# Patient Record
Sex: MALE | Race: BLACK OR AFRICAN AMERICAN | NOT HISPANIC OR LATINO | Employment: FULL TIME | URBAN - METROPOLITAN AREA
[De-identification: names, ages, dates, MRNs, and addresses within clinical notes are randomized per-mention and may not be internally consistent; named-entity substitution may affect disease eponyms.]

---

## 2018-12-08 ENCOUNTER — HOSPITAL ENCOUNTER (EMERGENCY)
Facility: CLINIC | Age: 53
Discharge: HOME | End: 2018-12-08
Attending: INTERNAL MEDICINE | Admitting: INTERNAL MEDICINE
Payer: COMMERCIAL

## 2018-12-08 VITALS
HEART RATE: 98 BPM | WEIGHT: 180 LBS | OXYGEN SATURATION: 97 % | SYSTOLIC BLOOD PRESSURE: 143 MMHG | BODY MASS INDEX: 26.58 KG/M2 | DIASTOLIC BLOOD PRESSURE: 90 MMHG | RESPIRATION RATE: 22 BRPM | TEMPERATURE: 97.8 F

## 2018-12-08 DIAGNOSIS — F10.10 ALCOHOL ABUSE: ICD-10-CM

## 2018-12-08 DIAGNOSIS — R11.2 NAUSEA AND VOMITING, INTRACTABILITY OF VOMITING NOT SPECIFIED, UNSPECIFIED VOMITING TYPE: ICD-10-CM

## 2018-12-08 LAB
ALBUMIN SERPL-MCNC: 4.2 G/DL (ref 3.4–5)
ALP SERPL-CCNC: 121 U/L (ref 40–150)
ALT SERPL W P-5'-P-CCNC: 143 U/L (ref 0–70)
ANION GAP SERPL CALCULATED.3IONS-SCNC: 14 MMOL/L (ref 3–14)
AST SERPL W P-5'-P-CCNC: 202 U/L (ref 0–45)
BASOPHILS # BLD AUTO: 0.1 10E9/L (ref 0–0.2)
BASOPHILS NFR BLD AUTO: 1.1 %
BILIRUB SERPL-MCNC: 1.2 MG/DL (ref 0.2–1.3)
BUN SERPL-MCNC: 12 MG/DL (ref 7–30)
CALCIUM SERPL-MCNC: 9.2 MG/DL (ref 8.5–10.1)
CHLORIDE SERPL-SCNC: 103 MMOL/L (ref 94–109)
CO2 SERPL-SCNC: 21 MMOL/L (ref 20–32)
CREAT SERPL-MCNC: 0.77 MG/DL (ref 0.66–1.25)
DIFFERENTIAL METHOD BLD: ABNORMAL
EOSINOPHIL # BLD AUTO: 0 10E9/L (ref 0–0.7)
EOSINOPHIL NFR BLD AUTO: 0 %
ERYTHROCYTE [DISTWIDTH] IN BLOOD BY AUTOMATED COUNT: 12.5 % (ref 10–15)
ETHANOL SERPL-MCNC: <0.01 G/DL
GFR SERPL CREATININE-BSD FRML MDRD: >90 ML/MIN/1.7M2
GLUCOSE SERPL-MCNC: 188 MG/DL (ref 70–99)
HCT VFR BLD AUTO: 43.7 % (ref 40–53)
HGB BLD-MCNC: 15.1 G/DL (ref 13.3–17.7)
IMM GRANULOCYTES # BLD: 0 10E9/L (ref 0–0.4)
IMM GRANULOCYTES NFR BLD: 0.3 %
LIPASE SERPL-CCNC: 77 U/L (ref 73–393)
LYMPHOCYTES # BLD AUTO: 0.6 10E9/L (ref 0.8–5.3)
LYMPHOCYTES NFR BLD AUTO: 7.7 %
MCH RBC QN AUTO: 33.4 PG (ref 26.5–33)
MCHC RBC AUTO-ENTMCNC: 34.6 G/DL (ref 31.5–36.5)
MCV RBC AUTO: 97 FL (ref 78–100)
MONOCYTES # BLD AUTO: 0.7 10E9/L (ref 0–1.3)
MONOCYTES NFR BLD AUTO: 9.8 %
NEUTROPHILS # BLD AUTO: 6.1 10E9/L (ref 1.6–8.3)
NEUTROPHILS NFR BLD AUTO: 81.1 %
NRBC # BLD AUTO: 0 10*3/UL
NRBC BLD AUTO-RTO: 0 /100
PLATELET # BLD AUTO: 289 10E9/L (ref 150–450)
POTASSIUM SERPL-SCNC: 4.2 MMOL/L (ref 3.4–5.3)
PROT SERPL-MCNC: 8.1 G/DL (ref 6.8–8.8)
RBC # BLD AUTO: 4.52 10E12/L (ref 4.4–5.9)
SODIUM SERPL-SCNC: 138 MMOL/L (ref 133–144)
WBC # BLD AUTO: 7.5 10E9/L (ref 4–11)

## 2018-12-08 PROCEDURE — 96361 HYDRATE IV INFUSION ADD-ON: CPT

## 2018-12-08 PROCEDURE — 83690 ASSAY OF LIPASE: CPT | Performed by: INTERNAL MEDICINE

## 2018-12-08 PROCEDURE — 85025 COMPLETE CBC W/AUTO DIFF WBC: CPT | Performed by: INTERNAL MEDICINE

## 2018-12-08 PROCEDURE — 96375 TX/PRO/DX INJ NEW DRUG ADDON: CPT

## 2018-12-08 PROCEDURE — 96374 THER/PROPH/DIAG INJ IV PUSH: CPT

## 2018-12-08 PROCEDURE — 25000128 H RX IP 250 OP 636: Performed by: INTERNAL MEDICINE

## 2018-12-08 PROCEDURE — 80053 COMPREHEN METABOLIC PANEL: CPT | Performed by: INTERNAL MEDICINE

## 2018-12-08 PROCEDURE — 99284 EMERGENCY DEPT VISIT MOD MDM: CPT | Mod: 25

## 2018-12-08 PROCEDURE — 80320 DRUG SCREEN QUANTALCOHOLS: CPT | Performed by: INTERNAL MEDICINE

## 2018-12-08 RX ORDER — SODIUM CHLORIDE 9 MG/ML
1000 INJECTION, SOLUTION INTRAVENOUS CONTINUOUS
Status: DISCONTINUED | OUTPATIENT
Start: 2018-12-08 | End: 2018-12-08 | Stop reason: HOSPADM

## 2018-12-08 RX ORDER — ONDANSETRON 2 MG/ML
4 INJECTION INTRAMUSCULAR; INTRAVENOUS EVERY 30 MIN PRN
Status: DISCONTINUED | OUTPATIENT
Start: 2018-12-08 | End: 2018-12-08 | Stop reason: HOSPADM

## 2018-12-08 RX ORDER — DIPHENHYDRAMINE HYDROCHLORIDE 50 MG/ML
25 INJECTION INTRAMUSCULAR; INTRAVENOUS ONCE
Status: COMPLETED | OUTPATIENT
Start: 2018-12-08 | End: 2018-12-08

## 2018-12-08 RX ORDER — ONDANSETRON 4 MG/1
4 TABLET, ORALLY DISINTEGRATING ORAL EVERY 8 HOURS PRN
Qty: 10 TABLET | Refills: 0 | Status: SHIPPED | OUTPATIENT
Start: 2018-12-08 | End: 2018-12-11

## 2018-12-08 RX ADMIN — SODIUM CHLORIDE 1000 ML: 9 INJECTION, SOLUTION INTRAVENOUS at 11:32

## 2018-12-08 RX ADMIN — DIPHENHYDRAMINE HYDROCHLORIDE 25 MG: 50 INJECTION INTRAMUSCULAR; INTRAVENOUS at 11:32

## 2018-12-08 RX ADMIN — ONDANSETRON 4 MG: 2 INJECTION INTRAMUSCULAR; INTRAVENOUS at 11:32

## 2018-12-08 ASSESSMENT — ENCOUNTER SYMPTOMS
VOMITING: 1
CHILLS: 1
FEVER: 0
ABDOMINAL PAIN: 0
COUGH: 0
RHINORRHEA: 0
NAUSEA: 1
DIARRHEA: 1
SHORTNESS OF BREATH: 0

## 2018-12-08 NOTE — ED TRIAGE NOTES
Pt presents with c/o's vomiting since 0200. Pt admits to drinking over half a pint of vodka last night and smoking mariajuana. Pt is A&O, ABC's intact.

## 2018-12-08 NOTE — ED PROVIDER NOTES
History     Chief Complaint:  Vomitting      HPI   Neel Fuentes is a 53 year old male with history of diabetes who presents with vomiting. The patient states that his symptoms began this this morning at 0200. He admits that he was drinking and using marijuana last night. The patient mentions that he is not a daily drinker and uses marijuana occassionally. He also had episodes of diarrhea, chills, and diaphoresis. The patient mentions that he feels like he cannot stop drinking after he begins to do so. He expresses that he wants to get outpatient help for this. Here in the ED, he is complaining of feeling extremely nausea and dehydrated. He denies fever, abdominal pain, chest pain, shortness of breath, cough, congestion or rhinorrhea.     Allergies:  NKDA    Medications:    The patient is currently on no regular medications.    Past Medical History:    DM    Past Surgical History:    History reviewed. No pertinent past surgical history.    Family History:    History reviewed. No pertinent family history.    Social History:  Marital Status:   [2]  Current smoker  Alcohol use    Review of Systems   Constitutional: Positive for chills. Negative for fever.   HENT: Negative for congestion and rhinorrhea.    Respiratory: Negative for cough and shortness of breath.    Cardiovascular: Negative for chest pain.   Gastrointestinal: Positive for diarrhea, nausea and vomiting. Negative for abdominal pain.   All other systems reviewed and are negative.      Physical Exam     Patient Vitals for the past 24 hrs:   BP Temp Temp src Pulse Resp SpO2 Weight   12/08/18 1125 - 97.8  F (36.6  C) Temporal - - - -   12/08/18 1115 (!) 153/93 - Temporal 98 22 98 % 81.6 kg (180 lb)       Physical Exam   Constitutional: He is cooperative.   Moaning and retching   HENT:   Mouth/Throat: Oropharynx is clear and moist and mucous membranes are normal.   Eyes: Conjunctivae are normal.   Neck: Normal range of motion.   Cardiovascular: Regular  rhythm and normal heart sounds.    Pulmonary/Chest: Effort normal and breath sounds normal.   Abdominal: Soft. Normal appearance and bowel sounds are normal. There is no rebound and no guarding.   Musculoskeletal: Normal range of motion.   Lymphadenopathy:     He has no cervical adenopathy.   Neurological: He is alert.   Skin: Skin is warm and dry.   Psychiatric: He has a normal mood and affect.       Emergency Department Course   Laboratory:  CBC: WBC: 7.5, HGB: 15.1, PLT: 289  CMP: Glucose 188(H), (H), (H), o/w WNL (Creatinine: 0.77)    Alcohol ethyl: <0.01    Lipase 77    Interventions:  1132 Zofran 4mg IV   Benadryl 25mg iV   Normal saline 1,000mL IV       Emergency Department Course:  Nursing notes and vitals reviewed. (1110) I performed an exam of the patient as documented above.     IV inserted. Medicine administered as documented above. Blood drawn. This was sent to the lab for further testing, results above.     (1153) I rechecked the patient and discussed the results of his workup thus far. He is currently feeling much better and is asking for water.     Findings and plan explained to the Patient. Patient discharged home with instructions regarding supportive care, medications, and reasons to return. The importance of close follow-up was reviewed. The patient was prescribed Zofran    I personally reviewed the laboratory results with the Patient and answered all related questions prior to discharge.     Impression & Plan    Medical Decision Making:    Neel Fuentes is a 53 year old male who presents with intractable nausea and vomiting.  He attributed this to excess alcohol intake last night.  He indicates a pattern of intermittent excessive drinking and to to his credit has already made contact to arrange for outpatient treatment on Monday.  I do not see any indication of a surgical problem in the abdomen.  Discussed with him it is possible he has an early viral gastroenteritis.  He does have  known diabetes and we discussed his elevated glucose.  Discussed with him his elevated liver function tests.  I have discharged him home with oral Zofran as needed, continue pursuing alcohol treatment, return if unable to take fluids, significant abdominal pain or other problems.      Diagnosis:    ICD-10-CM    1. Nausea and vomiting, intractability of vomiting not specified, unspecified vomiting type R11.2    2. Alcohol abuse F10.10        Disposition:  discharged to home    Discharge Medications:  New Prescriptions    ONDANSETRON (ZOFRAN ODT) 4 MG ODT TAB    Take 1 tablet (4 mg) by mouth every 8 hours as needed for nausea       Scribe Disclosure:  I, Nirmala Vogel, am serving as a scribe on 12/8/2018 at 11:10 AM to personally document services performed by Yolande Stone MD based on my observations and the provider's statements to me.       Nirmala Vogel  12/8/2018   [facility] EMERGENCY DEPARTMENT       Yolande Stone MD  12/08/18 [id number]

## 2018-12-08 NOTE — ED AVS SNAPSHOT
[facility] Emergency Department    [address]    [facility] [address]    Phone:  [phone]    Fax:  [phone]                                       Neel Fuentes   MRN: [id number]    Department:  [facility] Emergency Department   Date of Visit:  12/8/2018           After Visit Summary Signature Page     I have received my discharge instructions, and my questions have been answered. I have discussed any challenges I see with this plan with the nurse or doctor.    ..........................................................................................................................................  Patient/Patient Representative Signature      ..........................................................................................................................................  Patient Representative Print Name and Relationship to Patient    ..................................................               ................................................  Date                                   Time    ..........................................................................................................................................  Reviewed by Signature/Title    ...................................................              ..............................................  Date                                               Time          22EPIC Rev 08/18

## 2018-12-08 NOTE — ED AVS SNAPSHOT
[facility] Emergency Department    [address] [place] [address]    Phone:  [phone]    Fax:  [phone]                                       Neel Fuentes   MRN: [id number]    Department:  [facility] Emergency Department   Date of Visit:  12/8/2018           Patient Information     Date Of Birth          1965        Your diagnoses for this visit were:     Nausea and vomiting, intractability of vomiting not specified, unspecified vomiting type     Alcohol abuse        You were seen by Yolande Stone MD.        Discharge Instructions         Discharge Instructions  Vomiting    You have been seen today for vomiting. This is usually caused by a virus, but some bacteria, parasites, medicines or other medical conditions can cause similar symptoms. At this time your doctor does not find that your vomiting is a sign of anything dangerous or life-threatening. However, sometimes the signs of serious illness do not show up right away. If you have new or worse symptoms, you may need to be seen again in the emergency department or by your primary doctor. Remember that serious problems like appendicitis can start as vomiting.     Return to the Emergency Department if:    You keep throwing up and you are not able to keep liquids down.     You feel you are getting dehydrated, such as being very thirsty, not urinating at least every 8-12 hours, or feeling faint or lightheaded.     You develop a new fever, or your fever continues for more than 2 days.     You have belly pain that seems worse than cramps, is in one spot, or is getting worse over time.     You have blood in your vomit or stools.     You feel very weak.    You are not starting to improve within 24 hours of your visit here.     What can I do to help myself?    The most important thing to do is to drink clear liquids. If you have been vomiting a lot, it is best to have only small, frequent sips of liquids.  Drinking too much at once may cause more vomiting. If you are vomiting often, you must replace minerals, sodium and potassium lost with your illness. Pedialyte  and sports drinks can help you replace these minerals. You can also drink clear liquids such as water, weak tea, apple juice, and 7-Up . Avoid acid liquids (orange), caffeine (coffee) or alcohol. Do not drink milk until you no longer have diarrhea.     After liquids are staying down, you may start eating mild foods. Soda crackers, toast, plain noodles, gelatin, applesauce and bananas are good first choices. Avoid foods that have acid, are spicy, fatty or have a lot of fiber (such as meats, coarse grains, vegetables). You may start eating these foods again in about 3 days when you are better.     Sometimes treatment includes prescription medicine to prevent nausea and vomiting. If your doctor prescribes these for you, take them as directed.     Don t take ibuprofen, or other nonsteroidal anti-inflammatory medicines without checking with your healthcare provider.   If you were given a prescription for medicine here today, be sure to read all of the information (including the package insert) that comes with your prescription.  This will include important information about the medicine, its side effects, and any warnings that you need to know about.  The pharmacist who fills the prescription can provide more information and answer questions you may have about the medicine.  If you have questions or concerns that the pharmacist cannot address, please call or return to the Emergency Department.       Remember that you can always come back to the Emergency Department if you are not able to see your regular doctor in the amount of time listed above, if you get any new symptoms, or if there is anything that worries you.        24 Hour Appointment Hotline       To make an appointment at any [facility], call [phone] ([phone]). If you don't have a  family doctor or clinic, we will help you find one. [facility] clinics are conveniently located to serve the needs of you and your family.             Review of your medicines      START taking        Dose / Directions Last dose taken    ondansetron 4 MG ODT tab   Commonly known as:  ZOFRAN ODT   Dose:  4 mg   Quantity:  10 tablet        Take 1 tablet (4 mg) by mouth every 8 hours as needed for nausea   Refills:  0          Our records show that you are taking the medicines listed below. If these are incorrect, please call your family doctor or clinic.        Dose / Directions Last dose taken    BENADRYL PO        Refills:  0        folic acid-vit B6-vit B12 0.8-10-0.115 MG Tabs per tablet   Commonly known as:  FOLGARD        Take by mouth daily   Refills:  0        SERTRALINE HCL PO        Take by mouth daily   Refills:  0                Prescriptions were sent or printed at these locations (1 Prescription)                   Other Prescriptions                Printed at Department/Unit printer (1 of 1)         ondansetron (ZOFRAN ODT) 4 MG ODT tab                Procedures and tests performed during your visit     Alcohol level blood    CBC with platelets differential    Comprehensive metabolic panel    Lipase      Orders Needing Specimen Collection     None      Pending Results     No orders found from 12/6/2018 to 12/9/2018.            Pending Culture Results     No orders found from 12/6/2018 to 12/9/2018.            Pending Results Instructions     If you had any lab results that were not finalized at the time of your Discharge, you can call the ED Lab Result RN at [phone]. You will be contacted by this team for any positive Lab results or changes in treatment. The nurses are available 7 days a week from 10A to 6:30P.  You can leave a message 24 hours per day and they will return your call.        Test Results From Your Hospital Stay        12/8/2018 11:50 AM      Component Results     Component Value Ref  Range & Units Status    WBC 7.5 4.0 - 11.0 10e9/L Final    RBC Count 4.52 4.4 - 5.9 10e12/L Final    Hemoglobin 15.1 13.3 - 17.7 g/dL Final    Hematocrit 43.7 40.0 - 53.0 % Final    MCV 97 78 - 100 fl Final    MCH 33.4 (H) 26.5 - 33.0 pg Final    MCHC 34.6 31.5 - 36.5 g/dL Final    RDW 12.5 10.0 - 15.0 % Final    Platelet Count 289 150 - 450 10e9/L Final    Diff Method Automated Method  Final    % Neutrophils 81.1 % Final    % Lymphocytes 7.7 % Final    % Monocytes 9.8 % Final    % Eosinophils 0.0 % Final    % Basophils 1.1 % Final    % Immature Granulocytes 0.3 % Final    Nucleated RBCs 0 0 /100 Final    Absolute Neutrophil 6.1 1.6 - 8.3 10e9/L Final    Absolute Lymphocytes 0.6 (L) 0.8 - 5.3 10e9/L Final    Absolute Monocytes 0.7 0.0 - 1.3 10e9/L Final    Absolute Eosinophils 0.0 0.0 - 0.7 10e9/L Final    Absolute Basophils 0.1 0.0 - 0.2 10e9/L Final    Abs Immature Granulocytes 0.0 0 - 0.4 10e9/L Final    Absolute Nucleated RBC 0.0  Final         12/8/2018 12:27 PM      Component Results     Component Value Ref Range & Units Status    Sodium 138 133 - 144 mmol/L Final    Potassium 4.2 3.4 - 5.3 mmol/L Final    Chloride 103 94 - 109 mmol/L Final    Carbon Dioxide 21 20 - 32 mmol/L Final    Anion Gap 14 3 - 14 mmol/L Final    Glucose 188 (H) 70 - 99 mg/dL Final    Urea Nitrogen 12 7 - 30 mg/dL Final    Creatinine 0.77 0.66 - 1.25 mg/dL Final    GFR Estimate >90 >60 mL/min/1.7m2 Final    Non  GFR Calc    GFR Estimate If Black >90 >60 mL/min/1.7m2 Final    African American GFR Calc    Calcium 9.2 8.5 - 10.1 mg/dL Final    Bilirubin Total 1.2 0.2 - 1.3 mg/dL Final    Albumin 4.2 3.4 - 5.0 g/dL Final    Protein Total 8.1 6.8 - 8.8 g/dL Final    Alkaline Phosphatase 121 40 - 150 U/L Final     (H) 0 - 70 U/L Final     (H) 0 - 45 U/L Final         12/8/2018 12:27 PM      Component Results     Component Value Ref Range & Units Status    Lipase 77 73 - 393 U/L Final         12/8/2018 12:27 PM       Component Results     Component Value Ref Range & Units Status    Ethanol g/dL <0.01 <0.01 g/dL Final                Clinical Quality Measure: Blood Pressure Screening     Your blood pressure was checked while you were in the emergency department today. The last reading we obtained was  BP: (!) 153/93 . Please read the guidelines below about what these numbers mean and what you should do about them.  If your systolic blood pressure (the top number) is less than 120 and your diastolic blood pressure (the bottom number) is less than 80, then your blood pressure is normal. There is nothing more that you need to do about it.  If your systolic blood pressure (the top number) is 120-139 or your diastolic blood pressure (the bottom number) is 80-89, your blood pressure may be higher than it should be. You should have your blood pressure rechecked within a year by a primary care provider.  If your systolic blood pressure (the top number) is 140 or greater or your diastolic blood pressure (the bottom number) is 90 or greater, you may have high blood pressure. High blood pressure is treatable, but if left untreated over time it can put you at risk for heart attack, stroke, or kidney failure. You should have your blood pressure rechecked by a primary care provider within the next 4 weeks.  If your provider in the emergency department today gave you specific instructions to follow-up with your doctor or provider even sooner than that, you should follow that instruction and not wait for up to 4 weeks for your follow-up visit.        Thank you for choosing [facility]       Thank you for choosing [facility] for your care. Our goal is always to provide you with excellent care. Hearing back from our patients is one way we can continue to improve our services. Please take a few minutes to complete the written survey that you may receive in the mail after you visit with us. Thank you!        [facility] Information     [facility] lets you send  "messages to your doctor, view your test results, renew your prescriptions, schedule appointments and more. To sign up, go to [email or web address] . Click on \"Log in\" on the left side of the screen, which will take you to the Welcome page. Then click on \"Sign up Now\" on the right side of the page.     You will be asked to enter the access code listed below, as well as some personal information. Please follow the directions to create your username and password.     Your access code is: [email or web address]  Expires: 3/8/2019 12:47 PM     Your access code will  in 90 days. If you need help or a new code, please call your [facility] clinic or [phone].        Care EveryWhere ID     This is your Care EveryWhere ID. This could be used by other organizations to access your [facility] medical records  [id number]        Equal Access to Services     NOE LOZANO : Hadii kelli Chang, waaxda lubryon, qaybta kaalmada maría, thomas mcgrath . So [facility] [phone].    ATENCIÓN: Si habla español, tiene a pitts disposición servicios gratuitos de asistencia lingüística. Llame al [phone].    We comply with applicable federal civil rights laws and [place] laws. We do not discriminate on the basis of race, color, national origin, age, disability, sex, sexual orientation, or gender identity.            After Visit Summary       This is your record. Keep this with you and show to your community pharmacist(s) and doctor(s) at your next visit.                  "

## 2018-12-08 NOTE — DISCHARGE INSTRUCTIONS
Discharge Instructions  Vomiting    You have been seen today for vomiting. This is usually caused by a virus, but some bacteria, parasites, medicines or other medical conditions can cause similar symptoms. At this time your doctor does not find that your vomiting is a sign of anything dangerous or life-threatening. However, sometimes the signs of serious illness do not show up right away. If you have new or worse symptoms, you may need to be seen again in the emergency department or by your primary doctor. Remember that serious problems like appendicitis can start as vomiting.     Return to the Emergency Department if:    You keep throwing up and you are not able to keep liquids down.     You feel you are getting dehydrated, such as being very thirsty, not urinating at least every 8-12 hours, or feeling faint or lightheaded.     You develop a new fever, or your fever continues for more than 2 days.     You have belly pain that seems worse than cramps, is in one spot, or is getting worse over time.     You have blood in your vomit or stools.     You feel very weak.    You are not starting to improve within 24 hours of your visit here.     What can I do to help myself?    The most important thing to do is to drink clear liquids. If you have been vomiting a lot, it is best to have only small, frequent sips of liquids. Drinking too much at once may cause more vomiting. If you are vomiting often, you must replace minerals, sodium and potassium lost with your illness. Pedialyte  and sports drinks can help you replace these minerals. You can also drink clear liquids such as water, weak tea, apple juice, and 7-Up . Avoid acid liquids (orange), caffeine (coffee) or alcohol. Do not drink milk until you no longer have diarrhea.     After liquids are staying down, you may start eating mild foods. Soda crackers, toast, plain noodles, gelatin, applesauce and bananas are good first choices. Avoid foods that have acid, are spicy,  fatty or have a lot of fiber (such as meats, coarse grains, vegetables). You may start eating these foods again in about 3 days when you are better.     Sometimes treatment includes prescription medicine to prevent nausea and vomiting. If your doctor prescribes these for you, take them as directed.     Don t take ibuprofen, or other nonsteroidal anti-inflammatory medicines without checking with your healthcare provider.   If you were given a prescription for medicine here today, be sure to read all of the information (including the package insert) that comes with your prescription.  This will include important information about the medicine, its side effects, and any warnings that you need to know about.  The pharmacist who fills the prescription can provide more information and answer questions you may have about the medicine.  If you have questions or concerns that the pharmacist cannot address, please call or return to the Emergency Department.       Remember that you can always come back to the Emergency Department if you are not able to see your regular doctor in the amount of time listed above, if you get any new symptoms, or if there is anything that worries you.

## 2022-12-17 ENCOUNTER — TRANSFERRED RECORDS (OUTPATIENT)
Dept: HEALTH INFORMATION MANAGEMENT | Facility: CLINIC | Age: 57
End: 2022-12-17

## 2022-12-17 ENCOUNTER — HOSPITAL ENCOUNTER (EMERGENCY)
Facility: CLINIC | Age: 57
Discharge: HOME | End: 2022-12-17
Attending: EMERGENCY MEDICINE | Admitting: EMERGENCY MEDICINE
Payer: COMMERCIAL

## 2022-12-17 ENCOUNTER — APPOINTMENT (OUTPATIENT)
Dept: GENERAL RADIOLOGY | Facility: CLINIC | Age: 57
End: 2022-12-17
Attending: EMERGENCY MEDICINE
Payer: COMMERCIAL

## 2022-12-17 VITALS
HEART RATE: 91 BPM | TEMPERATURE: 97.9 F | HEIGHT: 68 IN | SYSTOLIC BLOOD PRESSURE: 130 MMHG | BODY MASS INDEX: 27.28 KG/M2 | OXYGEN SATURATION: 97 % | RESPIRATION RATE: 22 BRPM | WEIGHT: 180 LBS | DIASTOLIC BLOOD PRESSURE: 79 MMHG

## 2022-12-17 DIAGNOSIS — R55 SYNCOPE, UNSPECIFIED SYNCOPE TYPE: ICD-10-CM

## 2022-12-17 DIAGNOSIS — D50.8 OTHER IRON DEFICIENCY ANEMIA: ICD-10-CM

## 2022-12-17 LAB
ANION GAP SERPL CALCULATED.3IONS-SCNC: 12 MMOL/L (ref 3–14)
ATRIAL RATE - MUSE: 93 BPM
BASOPHILS # BLD AUTO: 0 10E3/UL (ref 0–0.2)
BASOPHILS NFR BLD AUTO: 1 %
BUN SERPL-MCNC: 9 MG/DL (ref 7–30)
CALCIUM SERPL-MCNC: 8.9 MG/DL (ref 8.5–10.1)
CHLORIDE BLD-SCNC: 97 MMOL/L (ref 94–109)
CO2 SERPL-SCNC: 26 MMOL/L (ref 20–32)
CREAT SERPL-MCNC: 1.18 MG/DL (ref 0.66–1.25)
DIASTOLIC BLOOD PRESSURE - MUSE: NORMAL MMHG
EOSINOPHIL # BLD AUTO: 0 10E3/UL (ref 0–0.7)
EOSINOPHIL NFR BLD AUTO: 0 %
ERYTHROCYTE [DISTWIDTH] IN BLOOD BY AUTOMATED COUNT: 17.5 % (ref 10–15)
GFR SERPL CREATININE-BSD FRML MDRD: 72 ML/MIN/1.73M2
GLUCOSE BLD-MCNC: 202 MG/DL (ref 70–99)
HCT VFR BLD AUTO: 36.2 % (ref 40–53)
HGB BLD-MCNC: 11.5 G/DL (ref 13.3–17.7)
IMM GRANULOCYTES # BLD: 0 10E3/UL
IMM GRANULOCYTES NFR BLD: 0 %
INTERPRETATION ECG - MUSE: NORMAL
LYMPHOCYTES # BLD AUTO: 0.7 10E3/UL (ref 0.8–5.3)
LYMPHOCYTES NFR BLD AUTO: 10 %
MCH RBC QN AUTO: 28 PG (ref 26.5–33)
MCHC RBC AUTO-ENTMCNC: 31.8 G/DL (ref 31.5–36.5)
MCV RBC AUTO: 88 FL (ref 78–100)
MONOCYTES # BLD AUTO: 0.8 10E3/UL (ref 0–1.3)
MONOCYTES NFR BLD AUTO: 11 %
NEUTROPHILS # BLD AUTO: 5.2 10E3/UL (ref 1.6–8.3)
NEUTROPHILS NFR BLD AUTO: 78 %
NRBC # BLD AUTO: 0 10E3/UL
NRBC BLD AUTO-RTO: 0 /100
P AXIS - MUSE: 26 DEGREES
PLATELET # BLD AUTO: 249 10E3/UL (ref 150–450)
POTASSIUM BLD-SCNC: 3.4 MMOL/L (ref 3.4–5.3)
PR INTERVAL - MUSE: 160 MS
QRS DURATION - MUSE: 90 MS
QT - MUSE: 370 MS
QTC - MUSE: 460 MS
R AXIS - MUSE: -25 DEGREES
RBC # BLD AUTO: 4.11 10E6/UL (ref 4.4–5.9)
SODIUM SERPL-SCNC: 135 MMOL/L (ref 133–144)
SYSTOLIC BLOOD PRESSURE - MUSE: NORMAL MMHG
T AXIS - MUSE: 5 DEGREES
TROPONIN I SERPL HS-MCNC: 26 NG/L
VENTRICULAR RATE- MUSE: 93 BPM
WBC # BLD AUTO: 6.6 10E3/UL (ref 4–11)

## 2022-12-17 PROCEDURE — 82310 ASSAY OF CALCIUM: CPT | Performed by: EMERGENCY MEDICINE

## 2022-12-17 PROCEDURE — 84484 ASSAY OF TROPONIN QUANT: CPT | Performed by: EMERGENCY MEDICINE

## 2022-12-17 PROCEDURE — 99285 EMERGENCY DEPT VISIT HI MDM: CPT | Mod: 25

## 2022-12-17 PROCEDURE — 258N000003 HC RX IP 258 OP 636: Performed by: EMERGENCY MEDICINE

## 2022-12-17 PROCEDURE — 93005 ELECTROCARDIOGRAM TRACING: CPT

## 2022-12-17 PROCEDURE — 71046 X-RAY EXAM CHEST 2 VIEWS: CPT

## 2022-12-17 PROCEDURE — 85014 HEMATOCRIT: CPT | Performed by: EMERGENCY MEDICINE

## 2022-12-17 PROCEDURE — 82374 ASSAY BLOOD CARBON DIOXIDE: CPT | Performed by: EMERGENCY MEDICINE

## 2022-12-17 PROCEDURE — 96360 HYDRATION IV INFUSION INIT: CPT

## 2022-12-17 PROCEDURE — 36415 COLL VENOUS BLD VENIPUNCTURE: CPT | Performed by: EMERGENCY MEDICINE

## 2022-12-17 PROCEDURE — 96361 HYDRATE IV INFUSION ADD-ON: CPT

## 2022-12-17 RX ADMIN — SODIUM CHLORIDE 1000 ML: 9 INJECTION, SOLUTION INTRAVENOUS at 13:27

## 2022-12-17 RX ADMIN — SODIUM CHLORIDE 1000 ML: 9 INJECTION, SOLUTION INTRAVENOUS at 14:22

## 2022-12-17 ASSESSMENT — ENCOUNTER SYMPTOMS
NUMBNESS: 0
DIAPHORESIS: 1
CONSTIPATION: 0
NECK PAIN: 0
FEVER: 0
WEAKNESS: 0
HEADACHES: 0
DIARRHEA: 0
COUGH: 0
CHILLS: 1
SHORTNESS OF BREATH: 0
HEMATURIA: 0
DYSURIA: 0
FREQUENCY: 0

## 2022-12-17 ASSESSMENT — ACTIVITIES OF DAILY LIVING (ADL)
ADLS_ACUITY_SCORE: 35
ADLS_ACUITY_SCORE: 35

## 2022-12-17 NOTE — ED TRIAGE NOTES
Pt BIBA for syncopal episode. Coming from [facility]. Had a syncopal episode. Pt might have been lowered to ground. No symptoms currently except light headed when standing. Had minor syncopal episode for EMS. Orthostatic performed by EMS SBP dropped 20 points. . Pt incontinent of stool. 324mg aspirin given. VSS now. On RA. ABCs intact.

## 2022-12-17 NOTE — ED PROVIDER NOTES
History   Chief Complaint:  Syncope     The history is provided by the patient.      Neel Fuentes is a 57 year old male with history of type 2 diabetes, hypertension and hyperlipidemia who presents with syncope. Patient reports that he was shopping at [employer] when he suddenly had a syncopal episode. He states that he felt warm and diaphoretic prior to the syncope. Patient did not fall, hit his head or injury his extremities. He notes that he slid down the counter. He adds he felt baseline yesterday. No history of syncope. No medication changes. No fever, but he has intermittent chills. No chest pain, cough, urinary symptoms, shortness of breath or abnormal stools. No headache, neck pain, new numbness or new weakness. Patient did not have breakfast. EMS reports that the patient's pressures were soft on their arrival and his blood sugar was 321.  Of note he does have short acting insulin that he takes occasionally however since he is lost a significant amount of weight his diabetes has been extremely well controlled and he does not normally need to take it.  They gave 324 mg of aspirin. His most recent surgery was in January of last year. He has chronic bilateral upper arm numbness with cervical radiculopathy. He follows with ortho spine.     Review of Systems   Constitutional: Positive for chills and diaphoresis. Negative for fever.   Respiratory: Negative for cough and shortness of breath.    Cardiovascular: Negative for chest pain.   Gastrointestinal: Negative for constipation and diarrhea.   Genitourinary: Negative for dysuria, frequency, hematuria and urgency.   Musculoskeletal: Negative for neck pain.   Neurological: Positive for syncope. Negative for weakness, numbness and headaches.   All other systems reviewed and are negative.    Allergies:  No Known Allergies    Medications:  Hydroxyzine HCl  Sertraline  Amlodipine  Gabapentin  Zolpidem  Vardenafil  Aspirin 81 mg    Past Medical History:    "  Hypertension  Type 2 diabetes  Anxiety   Chronic pain of both shoulders  Alcohol abuse  Pes planus  Testicular hypofunction  Depression  Hyperlipidemia   Obese     Past Surgical History:    Gastric bypass  Bilateral shoulder surgeries    Family History:    Father - alcohol/drug abuse    Social History:  Presents with daughter  Presents via private vehicle  PCP: Soren Eubanks     Physical Exam     Patient Vitals for the past 24 hrs:   BP Temp Temp src Pulse Resp SpO2 Height Weight   12/17/22 1350 106/76 -- -- 84 22 -- -- --   12/17/22 1330 107/75 -- -- 89 11 -- -- --   12/17/22 1315 114/78 -- -- 101 20 100 % -- --   12/17/22 1300 114/79 -- -- 98 28 100 % -- --   12/17/22 1245 105/83 -- -- 96 19 99 % -- --   12/17/22 1241 104/67 97.9  F (36.6  C) Oral 103 18 100 % 1.727 m (5' 8\") 81.6 kg (180 lb)       Physical Exam  Physical Exam   Constitutional:  Patient is oriented to person, place, and time. They appear well-developed and well-nourished. Mild distress secondary to lightheadedness.   HENT:   Mouth/Throat:   Oropharynx is clear and moist.   Eyes:    Conjunctivae normal and EOM are normal. Pupils are equal, round, and reactive to light.   Neck:    Normal range of motion.   Cardiovascular: Normal rate, regular rhythm and normal heart sounds.  Exam reveals no gallop and no friction rub.  No murmur heard.  Pulmonary/Chest:  Effort normal and breath sounds normal. Patient has no wheezes. Patient has no rales.   Abdominal:   Soft. Bowel sounds are normal. Patient exhibits no mass. There is no tenderness. There is no rebound and no guarding.   Musculoskeletal:  Normal range of motion. Patient exhibits no edema.   Neurological:   Patient is alert and oriented to person, place, and time. Patient has normal strength. No cranial nerve deficit or sensory deficit. GCS 15. Bilateral chronic upper extremity numbness or weakness due to cervical radiculopathy  Skin:   Skin is warm and dry. No rash noted. No erythema. "   Psychiatric:   Patient has a normal mood and affect. Patient's behavior is normal. Judgment and thought content normal.       Emergency Department Course   ECG  ECG results from 12/17/22   EKG 12-lead, tracing only     Value    Systolic Blood Pressure     Diastolic Blood Pressure     Ventricular Rate 93    Atrial Rate 93    NY Interval 160    QRS Duration 90        QTc 460    P Axis 26    R AXIS -25    T Axis 5    Interpretation ECG      Sinus rhythm  Moderate voltage criteria for LVH, may be normal variant ( R in aVL , [employer] product )  Borderline ECG  No previous ECGs available         Imaging:  Chest XR,  PA & LAT   Final Result   IMPRESSION: Negative chest.        Report per radiology    Laboratory:  Labs Ordered and Resulted from Time of ED Arrival to Time of ED Departure   BASIC METABOLIC PANEL - Abnormal       Result Value    Sodium 135      Potassium 3.4      Chloride 97      Carbon Dioxide (CO2) 26      Anion Gap 12      Urea Nitrogen 9      Creatinine 1.18      Calcium 8.9      Glucose 202 (*)     GFR Estimate 72     CBC WITH PLATELETS AND DIFFERENTIAL - Abnormal    WBC Count 6.6      RBC Count 4.11 (*)     Hemoglobin 11.5 (*)     Hematocrit 36.2 (*)     MCV 88      MCH 28.0      MCHC 31.8      RDW 17.5 (*)     Platelet Count 249      % Neutrophils 78      % Lymphocytes 10      % Monocytes 11      % Eosinophils 0      % Basophils 1      % Immature Granulocytes 0      NRBCs per 100 WBC 0      Absolute Neutrophils 5.2      Absolute Lymphocytes 0.7 (*)     Absolute Monocytes 0.8      Absolute Eosinophils 0.0      Absolute Basophils 0.0      Absolute Immature Granulocytes 0.0      Absolute NRBCs 0.0     TROPONIN I - Normal    Troponin I High Sensitivity 26          Emergency Department Course:     Reviewed:  I reviewed nursing notes, vitals, past medical history and Care Everywhere    Assessments:  1359 I obtained history and examined the patient as noted above.   1427 I rechecked the patient and  continued my exam.     Interventions:  Medications   0.9% sodium chloride BOLUS (0 mLs Intravenous Stopped 12/17/22 1422)   0.9% sodium chloride BOLUS (1,000 mLs Intravenous New Bag 12/17/22 1422)     Disposition:  The patient was discharged to home.     Impression & Plan   Medical Decision Making:  Neel Chappell is a 57-year-old gentleman presenting to the emergency department after he had a witnessed syncopal episode.  He was lowered to the ground gradually did not sustain any injury.  He was not postictal nor did he sustain injury.  He had not eaten breakfast this morning which was not unusual for him he was out shopping quite a bit prior to this happening.  He denied any preceding symptoms such as headache chest pain or vertigo.  On my evaluation he had no complaints.  IV fluid had been started by medics.  I did do blood work here.  EKG shows no ischemia or arrhythmia.  Chest x-ray shows no evidence of pneumothorax, effusion, infiltrate, mass, abnormal mediastinum.  He had no symptoms that were concerning for dissection PE or myocarditis/pericarditis.  His blood sugar was 202 here he has no acute metabolic derangement.  He is chronically anemic and at his baseline his white blood cell count is normal.  Cardiac enzyme is unremarkable.  After receiving IV fluids orthostatics were performed he had no symptoms and they were unremarkable.  Certainly could have been due to vasovagal as he was shopping quite a bit in his winter coat.  I do not think this is ACS or stroke.  At this time the patient does desire to go home.  I will have him follow closely with his primary care doctor.      Diagnosis:  No diagnosis found.    Discharge Medications:  New Prescriptions    No medications on file       Scribe Disclosure:  I, Stefania Fried, am serving as a scribe at 1:34 PM on 12/17/2022 to document services personally performed by Arlene Kimball MD based on my observations and the provider's statements to me.             Arlene Kimball MD  12/18/22 1038

## 2022-12-17 NOTE — ED NOTES
Bed: ED02  Expected date:   Expected time:   Means of arrival:   Comments:  515  57 M syncope  Here now

## 2023-02-16 ENCOUNTER — TELEPHONE (OUTPATIENT)
Dept: BEHAVIORAL HEALTH | Facility: CLINIC | Age: 58
End: 2023-02-16
Payer: COMMERCIAL

## 2023-02-16 ENCOUNTER — HOSPITAL ENCOUNTER (OUTPATIENT)
Dept: BEHAVIORAL HEALTH | Facility: CLINIC | Age: 58
Discharge: HOME | End: 2023-02-16
Attending: FAMILY MEDICINE | Admitting: FAMILY MEDICINE
Payer: COMMERCIAL

## 2023-02-16 VITALS — WEIGHT: 175 LBS | BODY MASS INDEX: 26.52 KG/M2 | HEIGHT: 68 IN

## 2023-02-16 PROCEDURE — H0001 ALCOHOL AND/OR DRUG ASSESS: HCPCS | Mod: GT,95 | Performed by: COUNSELOR

## 2023-02-16 RX ORDER — ATORVASTATIN CALCIUM 10 MG/1
10 TABLET, FILM COATED ORAL DAILY
COMMUNITY
Start: 2022-10-11 | End: 2024-02-24

## 2023-02-16 RX ORDER — AMLODIPINE BESYLATE 10 MG/1
10 TABLET ORAL DAILY
COMMUNITY
Start: 2023-01-09 | End: 2024-02-24

## 2023-02-16 RX ORDER — SERTRALINE HYDROCHLORIDE 100 MG/1
1.5 TABLET, FILM COATED ORAL DAILY
COMMUNITY
Start: 2022-02-08

## 2023-02-16 RX ORDER — FERROUS SULFATE 325(65) MG
TABLET ORAL
COMMUNITY
Start: 2022-10-20 | End: 2024-02-24

## 2023-02-16 RX ORDER — GABAPENTIN 300 MG/1
300 CAPSULE ORAL
COMMUNITY
Start: 2022-06-23 | End: 2024-02-24

## 2023-02-16 RX ORDER — ZOLPIDEM TARTRATE 5 MG/1
5 TABLET ORAL
COMMUNITY
Start: 2022-06-23 | End: 2024-02-24

## 2023-02-16 RX ORDER — FOLIC ACID 1 MG/1
1 TABLET ORAL DAILY
COMMUNITY
Start: 2023-01-24 | End: 2024-01-24

## 2023-02-16 ASSESSMENT — ANXIETY QUESTIONNAIRES
5. BEING SO RESTLESS THAT IT IS HARD TO SIT STILL: NOT AT ALL
4. TROUBLE RELAXING: NOT AT ALL
6. BECOMING EASILY ANNOYED OR IRRITABLE: SEVERAL DAYS
GAD7 TOTAL SCORE: 3
1. FEELING NERVOUS, ANXIOUS, OR ON EDGE: NOT AT ALL
7. FEELING AFRAID AS IF SOMETHING AWFUL MIGHT HAPPEN: SEVERAL DAYS
2. NOT BEING ABLE TO STOP OR CONTROL WORRYING: NOT AT ALL
GAD7 TOTAL SCORE: 3
3. WORRYING TOO MUCH ABOUT DIFFERENT THINGS: SEVERAL DAYS

## 2023-02-16 ASSESSMENT — COLUMBIA-SUICIDE SEVERITY RATING SCALE - C-SSRS
TOTAL  NUMBER OF ABORTED OR SELF INTERRUPTED ATTEMPTS LIFETIME: NO
ATTEMPT LIFETIME: NO
TOTAL  NUMBER OF INTERRUPTED ATTEMPTS LIFETIME: NO
2. HAVE YOU ACTUALLY HAD ANY THOUGHTS OF KILLING YOURSELF?: NO
6. HAVE YOU EVER DONE ANYTHING, STARTED TO DO ANYTHING, OR PREPARED TO DO ANYTHING TO END YOUR LIFE?: NO
1. HAVE YOU WISHED YOU WERE DEAD OR WISHED YOU COULD GO TO SLEEP AND NOT WAKE UP?: NO

## 2023-02-16 ASSESSMENT — PAIN SCALES - GENERAL: PAINLEVEL: NO PAIN (0)

## 2023-02-16 ASSESSMENT — PATIENT HEALTH QUESTIONNAIRE - PHQ9: SUM OF ALL RESPONSES TO PHQ QUESTIONS 1-9: 6

## 2023-02-16 NOTE — TELEPHONE ENCOUNTER
Pt called stating he did not receive email link for appt today and was wondering if that link could be sent to him.  Due to phone issues, wtr did not complete call with him.

## 2023-02-16 NOTE — PROGRESS NOTES
[facility]  [address] [place], [place] [address]., [place] [address]        2/16/2023      Neel Fuentes  [address]  [place] [address]      Dear Mr. Fuentes,    It was a pleasure meeting with you on 2/16/2023 for your Chemical Dependency Evaluation. Based on your evaluation, the recommendation is:  1)  Complete an Intensive Outpatient CD Treatment Program.  2)  Abstain from all mood-altering chemicals unless prescribed by a licensed provider.   3)  Attend, at minimum, 2 weekly support group meetings, such as 12 step based (AA/NA), SMART Recovery, Health Realizations, and/or Refuge Recovery meetings.     4)  Actively work with a male mentor/sponsor on a weekly basis.   5)  Follow all the recommendations of your treatment/medical providers.    Treatment program options:    [facility]: [phone] [email or web address]  [facility]: [email or web address]  [facility]: [email or web address]    As we discussed, I am taking a new role within [facility] as of 2/20/2023. Freddy Baugh, my co-worker, will be taking over for me. You can call him at [phone].    Please let myself or Freddy Baugh know which program you would like to attend.    Sincerely,    Ting Donald [facility]  CD Evaluation Counselor  [phone]    (this was sent to pt)
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
## 2023-08-28 ENCOUNTER — APPOINTMENT (OUTPATIENT)
Dept: GENERAL RADIOLOGY | Facility: CLINIC | Age: 58
End: 2023-08-28
Attending: EMERGENCY MEDICINE
Payer: COMMERCIAL

## 2023-08-28 ENCOUNTER — HOSPITAL ENCOUNTER (EMERGENCY)
Facility: CLINIC | Age: 58
Discharge: HOME | End: 2023-08-29
Attending: EMERGENCY MEDICINE | Admitting: EMERGENCY MEDICINE
Payer: COMMERCIAL

## 2023-08-28 DIAGNOSIS — M70.21 OLECRANON BURSITIS OF RIGHT ELBOW: ICD-10-CM

## 2023-08-28 PROCEDURE — 10060 I&D ABSCESS SIMPLE/SINGLE: CPT

## 2023-08-28 PROCEDURE — 99284 EMERGENCY DEPT VISIT MOD MDM: CPT | Mod: 25

## 2023-08-28 PROCEDURE — 250N000013 HC RX MED GY IP 250 OP 250 PS 637: Performed by: EMERGENCY MEDICINE

## 2023-08-28 PROCEDURE — 29105 APPLICATION LONG ARM SPLINT: CPT | Mod: RT

## 2023-08-28 PROCEDURE — 76882 US LMTD JT/FCL EVL NVASC XTR: CPT

## 2023-08-28 PROCEDURE — 73080 X-RAY EXAM OF ELBOW: CPT | Mod: RT

## 2023-08-28 RX ORDER — ACETAMINOPHEN 325 MG/1
975 TABLET ORAL ONCE
Status: COMPLETED | OUTPATIENT
Start: 2023-08-28 | End: 2023-08-28

## 2023-08-28 RX ORDER — IBUPROFEN 600 MG/1
600 TABLET, FILM COATED ORAL ONCE
Status: COMPLETED | OUTPATIENT
Start: 2023-08-28 | End: 2023-08-28

## 2023-08-28 RX ADMIN — ACETAMINOPHEN 975 MG: 325 TABLET ORAL at 22:09

## 2023-08-28 RX ADMIN — IBUPROFEN 600 MG: 600 TABLET ORAL at 22:09

## 2023-08-29 VITALS
DIASTOLIC BLOOD PRESSURE: 106 MMHG | OXYGEN SATURATION: 98 % | SYSTOLIC BLOOD PRESSURE: 145 MMHG | HEART RATE: 90 BPM | RESPIRATION RATE: 18 BRPM | TEMPERATURE: 97.5 F

## 2023-08-29 LAB
ANION GAP SERPL CALCULATED.3IONS-SCNC: 15 MMOL/L (ref 7–15)
BASOPHILS # BLD AUTO: 0.1 10E3/UL (ref 0–0.2)
BASOPHILS NFR BLD AUTO: 1 %
BUN SERPL-MCNC: 12.5 MG/DL (ref 6–20)
CALCIUM SERPL-MCNC: 9 MG/DL (ref 8.6–10)
CHLORIDE SERPL-SCNC: 105 MMOL/L (ref 98–107)
CREAT SERPL-MCNC: 0.67 MG/DL (ref 0.67–1.17)
CRP SERPL-MCNC: <3 MG/L
DEPRECATED HCO3 PLAS-SCNC: 20 MMOL/L (ref 22–29)
EOSINOPHIL # BLD AUTO: 0.1 10E3/UL (ref 0–0.7)
EOSINOPHIL NFR BLD AUTO: 1 %
ERYTHROCYTE [DISTWIDTH] IN BLOOD BY AUTOMATED COUNT: 17 % (ref 10–15)
ERYTHROCYTE [SEDIMENTATION RATE] IN BLOOD BY WESTERGREN METHOD: 17 MM/HR (ref 0–20)
GFR SERPL CREATININE-BSD FRML MDRD: >90 ML/MIN/1.73M2
GLUCOSE SERPL-MCNC: 123 MG/DL (ref 70–99)
HCT VFR BLD AUTO: 30 % (ref 40–53)
HGB BLD-MCNC: 10 G/DL (ref 13.3–17.7)
IMM GRANULOCYTES # BLD: 0 10E3/UL
IMM GRANULOCYTES NFR BLD: 0 %
LYMPHOCYTES # BLD AUTO: 1.7 10E3/UL (ref 0.8–5.3)
LYMPHOCYTES NFR BLD AUTO: 23 %
MCH RBC QN AUTO: 29.6 PG (ref 26.5–33)
MCHC RBC AUTO-ENTMCNC: 33.3 G/DL (ref 31.5–36.5)
MCV RBC AUTO: 89 FL (ref 78–100)
MONOCYTES # BLD AUTO: 1.4 10E3/UL (ref 0–1.3)
MONOCYTES NFR BLD AUTO: 19 %
NEUTROPHILS # BLD AUTO: 4 10E3/UL (ref 1.6–8.3)
NEUTROPHILS NFR BLD AUTO: 56 %
NRBC # BLD AUTO: 0 10E3/UL
NRBC BLD AUTO-RTO: 0 /100
PLATELET # BLD AUTO: 284 10E3/UL (ref 150–450)
POTASSIUM SERPL-SCNC: 4.3 MMOL/L (ref 3.4–5.3)
RBC # BLD AUTO: 3.38 10E6/UL (ref 4.4–5.9)
SODIUM SERPL-SCNC: 140 MMOL/L (ref 136–145)
WBC # BLD AUTO: 7.3 10E3/UL (ref 4–11)

## 2023-08-29 PROCEDURE — 36415 COLL VENOUS BLD VENIPUNCTURE: CPT | Performed by: EMERGENCY MEDICINE

## 2023-08-29 PROCEDURE — 87070 CULTURE OTHR SPECIMN AEROBIC: CPT | Performed by: EMERGENCY MEDICINE

## 2023-08-29 PROCEDURE — 86140 C-REACTIVE PROTEIN: CPT | Performed by: EMERGENCY MEDICINE

## 2023-08-29 PROCEDURE — 80048 BASIC METABOLIC PNL TOTAL CA: CPT | Performed by: EMERGENCY MEDICINE

## 2023-08-29 PROCEDURE — 85025 COMPLETE CBC W/AUTO DIFF WBC: CPT | Performed by: EMERGENCY MEDICINE

## 2023-08-29 PROCEDURE — 85652 RBC SED RATE AUTOMATED: CPT | Performed by: EMERGENCY MEDICINE

## 2023-08-29 RX ORDER — LIDOCAINE HYDROCHLORIDE 10 MG/ML
INJECTION, SOLUTION EPIDURAL; INFILTRATION; INTRACAUDAL; PERINEURAL
Status: DISCONTINUED
Start: 2023-08-29 | End: 2023-08-29 | Stop reason: HOSPADM

## 2023-08-29 ASSESSMENT — ACTIVITIES OF DAILY LIVING (ADL): ADLS_ACUITY_SCORE: 33

## 2023-08-29 NOTE — DISCHARGE INSTRUCTIONS
Please monitor symptoms closely.  Follow-up with orthopedics in 2 to 3 days for recheck.    Return to the ER if you develop worsening pain, swelling, redness, drainage of pus, fevers or any other concerns.    Discharge Instructions  Splint Care    You had a splint put on today to help protect your injury and help it heal.  Splints are used to treat things like strains, sprains, large cuts, and fractures (broken bones). Splints are temporary and are designed to allow for swelling.    Be sure your splint is not too tight!  If you splint is too tight, it may cause loss of blood supply.  Signs of your splint being too tight include: your arm or leg hurting a lot more; your fingers or toes getting numb, cold, pale or blue; or your child is crying, fussing or seeming restless.    Generally, every Emergency Department visit should have a follow-up clinic visit with either a primary or a specialty clinic/provider. Please follow-up as instructed by your emergency provider today.  Return to the Emergency Department right away if:  You have increased pain or pressure around the injury.  You have numbness, tingling, or cool, pale, or blue toes or fingers past the injury.  Your child is more fussy than normal, crying a lot, or restless.  Your splint becomes soft, breaks, or is wet.  Your splint begins to smell bad.  Your splint is cutting into your skin.    Home care:  Keep the injured area above the level of your heart while laying or sitting down.  This will help decrease the swelling and the pain.  Keep the splint dry.  Do not put objects down or inside the splint.  If there is an elastic bandage (Ace  wrap) holding the splint on this may be loosened slightly to relieve pressure or pain.  If pain continues return to the Emergency Department right away.  Do not remove your splint by yourself unless told to by your provider.    Follow-up:  Sometimes the splint put on in the Emergency Department needs to be changed once the  swelling has gone down and a more permanent cast needs to be placed.  This is usually done by a bone specialist provider (Orthopedist).  Follow the instructions given to you by your provider today.    If you were given a prescription for medicine here today, be sure to read all of the information (including the package insert) that comes with your prescription.  This will include important information about the medicine, its side effects, and any warnings that you need to know about.  The pharmacist who fills the prescription can provide more information and answer questions you may have about the medicine.  If you have questions or concerns that the pharmacist cannot address, please call or return to the Emergency Department.     Remember that you can always come back to the Emergency Department if you are not able to see your regular provider in the amount of time listed above, if you get any new symptoms, or if there is anything that worries you.

## 2023-08-29 NOTE — ED PROVIDER NOTES
"  History     Chief Complaint:  Elbow Injury       HPI   Neel Fuentes is a 58 year old male with a history of diabetes who presents to the emergency department for evaluation of an elbow injury. Patient reports that he flew back from vacation in [facility] today and was feeling normal all day today including through the duration of the flight. After arriving home he noticed right elbow swelling and tingling of the entire right extremity from the shoulder to the finger tips. He says that the elbow is \"on fire\", although he does not feel heat coming off of it with touch but does feel movement of a possible fluid collection. Patient confirms pain in the elbow joint with movement of the right extremity. Patient has been experiencing chills but denies fever. Icing of the elbow helped with pain. Patient took Tylenol and ibuprofen upon arrival to the ED. He has baseline tingling of the hands secondary to carpal tunnel syndrome which he is supposed to have surgery for this week, however he has never experienced tingling like this before. Patient says that he was not aware of any injury or scratch to the elbow and does not recall resting on his elbows more than usual. Patient says that he does have a history of marijuana use but has no history of IV drug use. He notes history of nerve problems in the spine, scheduled for surgery to the cervical spine on September 5th.     Independent Historian:   None - Patient Only    Review of External Notes:   None     Medications:    Norvasc   Aspirin 81 mg   Lipitor  Zoloft  Sertraline  Levitra  Gabapentin     Past Medical History:   Diabetes mellitus   Hypertension   Alcohol abuse  Depression   Hyperlipidemia     Past Surgical History:    Gastric bypass  Shoulder scope x2    Physical Exam   Patient Vitals for the past 24 hrs:   BP Temp Pulse Resp SpO2   08/29/23 0122 (!) 145/106 -- 90 -- --   08/28/23 2205 (!) 159/122 97.5  F (36.4  C) 96 18 98 %        Physical Exam  General:              " Well-nourished              Speaking in full sentences  Eyes:              Conjunctiva without injection or scleral icterus  ENT:              Moist mucous membranes              Nares patent              Pinnae normal  Neck:              Full ROM              No stiffness appreciated  Resp:              Lungs CTAB              No crackles, wheezing or audible rubs              Good air movement  CV:                    Normal rate, regular rhythm              S1 and S2 present              No murmur, gallop or rub  Skin:              Warm, dry, well perfused              No rashes or open wounds on exposed skin  MSK:              Moves all extremities              RUE:              Localized swelling over olecranon process with fluctuance              No overlying erythema or warmth              No open wounds              Able to supinate / pronate at elbow joint without notable pain              Flexion at elbow limited by pain over olecranon bursa   Compartments soft throughout   2+ radial pulse   Able to wiggle all fingers  Neuro:              Alert              Answers questions appropriately              Moves all extremities equally              Gait stable  Psych:              Normal affect, normal mood    Emergency Department Course   Imaging:  POC US SOFT TISSUE   Final Result      Limited Bedside ED Ultrasound of Soft Tissue Procedure Note:      PROCEDURE: PERFORMED BY: Dr. Angel Kumar MD   INDICATIONS/SYMPTOM: Skin redness, evaluate for abscess, cellulitis or foreign body   PROBE: High frequency linear probe   BODY LOCATION: Soft tissue located on extremity       FINDINGS: Cobblestoning of soft tissue: absent    Hypoechoic fluid (ie abscess) identified: present measuring 5 cm    US utilized to access fluid pocket with needle   INTERPRETATION:  The soft tissue and muscle layers were evaluated.       Findings indicate fluid collection      IMAGE DOCUMENTATION: Images were archived to hard drive.   Unable to sync with PACs.            XR Elbow Right G/E 3 Views   Final Result   IMPRESSION: Normal joint spaces and alignment. No fracture or joint effusion. Mild degenerative spurring seen at the coronoid process and olecranon of the ulna. Marked soft tissue swelling is seen in the posterior elbow.         Report per radiology    Laboratory:  Labs Ordered and Resulted from Time of ED Arrival to Time of ED Departure   BASIC METABOLIC PANEL - Abnormal       Result Value    Sodium 140      Potassium 4.3      Chloride 105      Carbon Dioxide (CO2) 20 (*)     Anion Gap 15      Urea Nitrogen 12.5      Creatinine 0.67      Calcium 9.0      Glucose 123 (*)     GFR Estimate >90     CBC WITH PLATELETS AND DIFFERENTIAL - Abnormal    WBC Count 7.3      RBC Count 3.38 (*)     Hemoglobin 10.0 (*)     Hematocrit 30.0 (*)     MCV 89      MCH 29.6      MCHC 33.3      RDW 17.0 (*)     Platelet Count 284      % Neutrophils 56      % Lymphocytes 23      % Monocytes 19      % Eosinophils 1      % Basophils 1      % Immature Granulocytes 0      NRBCs per 100 WBC 0      Absolute Neutrophils 4.0      Absolute Lymphocytes 1.7      Absolute Monocytes 1.4 (*)     Absolute Eosinophils 0.1      Absolute Basophils 0.1      Absolute Immature Granulocytes 0.0      Absolute NRBCs 0.0     CRP INFLAMMATION - Normal    CRP Inflammation <3.00     ERYTHROCYTE SEDIMENTATION RATE AUTO - Normal    Erythrocyte Sedimentation Rate 17     AEROBIC BACTERIAL CULTURE ROUTINE        Procedures     Incision and Drainage     Procedure: Incision and Drainage     Consent: Verbal    Indication:  Bursitis    Location: Extremity, upper    Size: cm    Ultrasound Guidance: Yes, see separate procedural guidance POCUS note     Preparation: Povidone-iodine     Anesthesia/Sedation: Lidocaine - 1%     Procedure Detail:    Aspiration: Yes  Incision Type: Stab  Needle: 20 gauge  Wound Management: Left open   Packing: None     Patient Status: The patient tolerated the procedure  well: Yes. There were no complications. Aspiration yielded 3 ccs of bloody fluid.      Splint Placement     Procedure: Splint Placement     Indication: Pain    Consent: Verbal     Location: Right elbow    Preparation: Wounds were cleansed and dressed with a non-adherent bandage    Procedure detail:   Splint was applied by Myself  Splint type: Short-arm posterior slab  Splint materilal: Fiberglass  After placement I checked and adjusted the fit as needed to ensure proper positioning/fit.  Sensation and circulation are intact after splint placement.    Patient Status: The patient tolerated the procedure well: Yes. There were no complications.  Emergency Department Course & Assessments:             Interventions:  Medications   lidocaine (PF) (XYLOCAINE) 1 % injection (has no administration in time range)   acetaminophen (TYLENOL) tablet 975 mg (975 mg Oral $Given 8/28/23 2209)   ibuprofen (ADVIL/MOTRIN) tablet 600 mg (600 mg Oral $Given 8/28/23 2209)        Assessments:  2346 I obtained the history and examined the patient as noted above.   0035 I performed the incision and drainage procedure as noted above.   0110 I performed the splint placement procedure as noted above.     Independent Interpretation (X-rays, CTs, rhythm strip):  Reviewed patient's XR. There is no fracture.    Consultations/Discussion of Management or Tests:  None        Social Determinants of Health affecting care:   None    Disposition:  The patient was discharged to home.     Impression & Plan    Medical Decision Making:  Neel Fuentes is a 58-year-old male presenting to the ER for evaluation of right elbow pain.  VS on presentation reveal elevated BP though otherwise are unremarkable.  His evaluation is most consistent with olecranon bursitis, likely hemorrhagic.  Bedside ultrasound utilized, demonstrating a soft tissue fluid collection over the olecranon bursa.  After informed written and verbal consent obtained (including risk of infection,  bleeding, and damage to adjacent structures), needle aspiration performed demonstrating bloody fluid.  This will be sent for culture, though overall, very low suspicion for septic bursitis.  Labs demonstrate normal WBC count, normal ESR, and undetectable CRP.  Hemoglobin is 10.0, though stable compared with previous values available for comparison from outside facility.  I strongly considered septic arthritis though clinically feel this to be unlikely.  Patient does not exhibit notable pain over the medial aspect of the elbow, and maintains ability to supinate/pronate without difficulty.  He was placed in a splint for immobilization to assist with comfort.  I recommended very close follow-up with orthopedic surgery in the next 2 days for reevaluation.  With reasonable clinical certainty I feel antibiotic therapy can be safely withheld at this time.  Patient feels very comfortable with outlined plan of care.  He will continue with NSAID medications as needed for pain or discomfort.  Return to ER with any new or troubling symptoms such as increasing pain, fevers, drainage, or any other concerns.     Diagnosis:    ICD-10-CM    1. Olecranon bursitis of right elbow  M70.21          Scribe Disclosure:  I, Leticia Raphael, am serving as a scribe  for Venessa Conklin at 12:43 AM on 8/29/2023 to document services personally performed by Angel Kumar MD based on my observations and the provider's statements to me.    8/28/2023   Angel Kumar MD Roach, Brian Donald, MD  08/29/23 023

## 2023-08-29 NOTE — ED TRIAGE NOTES
Arrives from home. States he just returned from [facility] and was at home and notice a bump on his right elbow, states it immediately became quite swollen, painful and states it feels like it is on fire. States this was no there a couple of hours ago on the flight home.

## 2023-09-03 LAB — BACTERIA SNV CULT: NO GROWTH

## 2024-02-24 ENCOUNTER — ANESTHESIA (OUTPATIENT)
Dept: SURGERY | Facility: CLINIC | Age: 59
End: 2024-02-24
Payer: COMMERCIAL

## 2024-02-24 ENCOUNTER — HOSPITAL ENCOUNTER (OUTPATIENT)
Facility: CLINIC | Age: 59
Setting detail: OBSERVATION
Discharge: HOME | End: 2024-02-25
Attending: EMERGENCY MEDICINE | Admitting: INTERNAL MEDICINE
Payer: COMMERCIAL

## 2024-02-24 ENCOUNTER — APPOINTMENT (OUTPATIENT)
Dept: CT IMAGING | Facility: CLINIC | Age: 59
End: 2024-02-24
Attending: EMERGENCY MEDICINE
Payer: COMMERCIAL

## 2024-02-24 ENCOUNTER — ANESTHESIA EVENT (OUTPATIENT)
Dept: SURGERY | Facility: CLINIC | Age: 59
End: 2024-02-24
Payer: COMMERCIAL

## 2024-02-24 ENCOUNTER — APPOINTMENT (OUTPATIENT)
Dept: GENERAL RADIOLOGY | Facility: CLINIC | Age: 59
End: 2024-02-24
Attending: EMERGENCY MEDICINE
Payer: COMMERCIAL

## 2024-02-24 ENCOUNTER — APPOINTMENT (OUTPATIENT)
Dept: ULTRASOUND IMAGING | Facility: CLINIC | Age: 59
End: 2024-02-24
Attending: EMERGENCY MEDICINE
Payer: COMMERCIAL

## 2024-02-24 DIAGNOSIS — K81.9 CHOLECYSTITIS: Primary | ICD-10-CM

## 2024-02-24 DIAGNOSIS — K80.20 SYMPTOMATIC CHOLELITHIASIS: ICD-10-CM

## 2024-02-24 DIAGNOSIS — R07.9 CHEST PAIN, UNSPECIFIED TYPE: ICD-10-CM

## 2024-02-24 DIAGNOSIS — K76.0 HEPATIC STEATOSIS: ICD-10-CM

## 2024-02-24 DIAGNOSIS — R11.2 NAUSEA AND VOMITING, UNSPECIFIED VOMITING TYPE: ICD-10-CM

## 2024-02-24 DIAGNOSIS — R10.10 INTRACTABLE UPPER ABDOMINAL PAIN: ICD-10-CM

## 2024-02-24 DIAGNOSIS — I10 HYPERTENSION, UNSPECIFIED TYPE: ICD-10-CM

## 2024-02-24 LAB
ALBUMIN SERPL BCG-MCNC: 4.8 G/DL (ref 3.5–5.2)
ALP SERPL-CCNC: 216 U/L (ref 40–150)
ALT SERPL W P-5'-P-CCNC: 93 U/L (ref 0–70)
ANION GAP SERPL CALCULATED.3IONS-SCNC: 22 MMOL/L (ref 7–15)
AST SERPL W P-5'-P-CCNC: 150 U/L (ref 0–45)
BASOPHILS # BLD AUTO: 0.1 10E3/UL (ref 0–0.2)
BASOPHILS NFR BLD AUTO: 1 %
BILIRUB DIRECT SERPL-MCNC: 0.59 MG/DL (ref 0–0.3)
BILIRUB SERPL-MCNC: 1.4 MG/DL
BUN SERPL-MCNC: 14.7 MG/DL (ref 6–20)
CALCIUM SERPL-MCNC: 10.2 MG/DL (ref 8.6–10)
CHLORIDE SERPL-SCNC: 100 MMOL/L (ref 98–107)
CREAT SERPL-MCNC: 0.83 MG/DL (ref 0.67–1.17)
DEPRECATED HCO3 PLAS-SCNC: 17 MMOL/L (ref 22–29)
EGFRCR SERPLBLD CKD-EPI 2021: >90 ML/MIN/1.73M2
EOSINOPHIL # BLD AUTO: 0 10E3/UL (ref 0–0.7)
EOSINOPHIL NFR BLD AUTO: 0 %
ERYTHROCYTE [DISTWIDTH] IN BLOOD BY AUTOMATED COUNT: 16.8 % (ref 10–15)
ETHANOL SERPL-MCNC: <0.01 G/DL
GLUCOSE BLDC GLUCOMTR-MCNC: 144 MG/DL (ref 70–99)
GLUCOSE SERPL-MCNC: 152 MG/DL (ref 70–99)
HCT VFR BLD AUTO: 40.2 % (ref 40–53)
HGB BLD-MCNC: 13.5 G/DL (ref 13.3–17.7)
HOLD SPECIMEN: NORMAL
HOLD SPECIMEN: NORMAL
IMM GRANULOCYTES # BLD: 0 10E3/UL
IMM GRANULOCYTES NFR BLD: 0 %
LACTATE SERPL-SCNC: 0.7 MMOL/L (ref 0.7–2)
LACTATE SERPL-SCNC: 2.6 MMOL/L (ref 0.7–2)
LACTATE SERPL-SCNC: 2.7 MMOL/L (ref 0.7–2)
LIPASE SERPL-CCNC: 22 U/L (ref 13–60)
LYMPHOCYTES # BLD AUTO: 1.5 10E3/UL (ref 0.8–5.3)
LYMPHOCYTES NFR BLD AUTO: 21 %
MCH RBC QN AUTO: 30.2 PG (ref 26.5–33)
MCHC RBC AUTO-ENTMCNC: 33.6 G/DL (ref 31.5–36.5)
MCV RBC AUTO: 90 FL (ref 78–100)
MONOCYTES # BLD AUTO: 1.3 10E3/UL (ref 0–1.3)
MONOCYTES NFR BLD AUTO: 18 %
NEUTROPHILS # BLD AUTO: 4.5 10E3/UL (ref 1.6–8.3)
NEUTROPHILS NFR BLD AUTO: 60 %
NRBC # BLD AUTO: 0 10E3/UL
NRBC BLD AUTO-RTO: 0 /100
PLATELET # BLD AUTO: 370 10E3/UL (ref 150–450)
POTASSIUM SERPL-SCNC: 4.2 MMOL/L (ref 3.4–5.3)
PROT SERPL-MCNC: 8.6 G/DL (ref 6.4–8.3)
RBC # BLD AUTO: 4.47 10E6/UL (ref 4.4–5.9)
SODIUM SERPL-SCNC: 139 MMOL/L (ref 135–145)
TROPONIN T SERPL HS-MCNC: 10 NG/L
TROPONIN T SERPL HS-MCNC: 13 NG/L
WBC # BLD AUTO: 7.4 10E3/UL (ref 4–11)

## 2024-02-24 PROCEDURE — 88304 TISSUE EXAM BY PATHOLOGIST: CPT | Mod: TC | Performed by: SURGERY

## 2024-02-24 PROCEDURE — 250N000011 HC RX IP 250 OP 636: Performed by: NURSE ANESTHETIST, CERTIFIED REGISTERED

## 2024-02-24 PROCEDURE — 258N000003 HC RX IP 258 OP 636: Performed by: NURSE ANESTHETIST, CERTIFIED REGISTERED

## 2024-02-24 PROCEDURE — 74177 CT ABD & PELVIS W/CONTRAST: CPT

## 2024-02-24 PROCEDURE — 250N000009 HC RX 250: Performed by: EMERGENCY MEDICINE

## 2024-02-24 PROCEDURE — 96376 TX/PRO/DX INJ SAME DRUG ADON: CPT | Mod: 59

## 2024-02-24 PROCEDURE — 82077 ASSAY SPEC XCP UR&BREATH IA: CPT | Performed by: EMERGENCY MEDICINE

## 2024-02-24 PROCEDURE — 71046 X-RAY EXAM CHEST 2 VIEWS: CPT

## 2024-02-24 PROCEDURE — 76705 ECHO EXAM OF ABDOMEN: CPT

## 2024-02-24 PROCEDURE — 80053 COMPREHEN METABOLIC PANEL: CPT | Performed by: EMERGENCY MEDICINE

## 2024-02-24 PROCEDURE — G0378 HOSPITAL OBSERVATION PER HR: HCPCS

## 2024-02-24 PROCEDURE — 96375 TX/PRO/DX INJ NEW DRUG ADDON: CPT

## 2024-02-24 PROCEDURE — 710N000009 HC RECOVERY PHASE 1, LEVEL 1, PER MIN: Performed by: SURGERY

## 2024-02-24 PROCEDURE — 250N000025 HC SEVOFLURANE, PER MIN: Performed by: SURGERY

## 2024-02-24 PROCEDURE — 36415 COLL VENOUS BLD VENIPUNCTURE: CPT | Performed by: PHYSICIAN ASSISTANT

## 2024-02-24 PROCEDURE — 85025 COMPLETE CBC W/AUTO DIFF WBC: CPT | Performed by: EMERGENCY MEDICINE

## 2024-02-24 PROCEDURE — 258N000003 HC RX IP 258 OP 636: Performed by: EMERGENCY MEDICINE

## 2024-02-24 PROCEDURE — 370N000017 HC ANESTHESIA TECHNICAL FEE, PER MIN: Performed by: SURGERY

## 2024-02-24 PROCEDURE — 47562 LAPAROSCOPIC CHOLECYSTECTOMY: CPT | Mod: AS | Performed by: PHYSICIAN ASSISTANT

## 2024-02-24 PROCEDURE — 999N000141 HC STATISTIC PRE-PROCEDURE NURSING ASSESSMENT: Performed by: SURGERY

## 2024-02-24 PROCEDURE — 88304 TISSUE EXAM BY PATHOLOGIST: CPT | Mod: 26 | Performed by: PATHOLOGY

## 2024-02-24 PROCEDURE — 99285 EMERGENCY DEPT VISIT HI MDM: CPT | Mod: 25

## 2024-02-24 PROCEDURE — C9113 INJ PANTOPRAZOLE SODIUM, VIA: HCPCS | Performed by: PHYSICIAN ASSISTANT

## 2024-02-24 PROCEDURE — 80048 BASIC METABOLIC PNL TOTAL CA: CPT | Performed by: EMERGENCY MEDICINE

## 2024-02-24 PROCEDURE — 250N000011 HC RX IP 250 OP 636: Performed by: EMERGENCY MEDICINE

## 2024-02-24 PROCEDURE — 250N000011 HC RX IP 250 OP 636: Performed by: SURGERY

## 2024-02-24 PROCEDURE — 96374 THER/PROPH/DIAG INJ IV PUSH: CPT | Mod: 59

## 2024-02-24 PROCEDURE — 250N000009 HC RX 250: Performed by: NURSE ANESTHETIST, CERTIFIED REGISTERED

## 2024-02-24 PROCEDURE — 84484 ASSAY OF TROPONIN QUANT: CPT | Performed by: EMERGENCY MEDICINE

## 2024-02-24 PROCEDURE — 250N000011 HC RX IP 250 OP 636: Performed by: PHYSICIAN ASSISTANT

## 2024-02-24 PROCEDURE — 258N000003 HC RX IP 258 OP 636: Performed by: ANESTHESIOLOGY

## 2024-02-24 PROCEDURE — 36415 COLL VENOUS BLD VENIPUNCTURE: CPT | Performed by: EMERGENCY MEDICINE

## 2024-02-24 PROCEDURE — 82248 BILIRUBIN DIRECT: CPT | Performed by: EMERGENCY MEDICINE

## 2024-02-24 PROCEDURE — S2900 ROBOTIC SURGICAL SYSTEM: HCPCS | Performed by: SURGERY

## 2024-02-24 PROCEDURE — 83605 ASSAY OF LACTIC ACID: CPT | Performed by: EMERGENCY MEDICINE

## 2024-02-24 PROCEDURE — 96361 HYDRATE IV INFUSION ADD-ON: CPT | Mod: 59

## 2024-02-24 PROCEDURE — 82962 GLUCOSE BLOOD TEST: CPT

## 2024-02-24 PROCEDURE — 272N000001 HC OR GENERAL SUPPLY STERILE: Performed by: SURGERY

## 2024-02-24 PROCEDURE — 250N000009 HC RX 250: Performed by: SURGERY

## 2024-02-24 PROCEDURE — 99222 1ST HOSP IP/OBS MODERATE 55: CPT | Performed by: PHYSICIAN ASSISTANT

## 2024-02-24 PROCEDURE — 360N000080 HC SURGERY LEVEL 7, PER MIN: Performed by: SURGERY

## 2024-02-24 PROCEDURE — 93005 ELECTROCARDIOGRAM TRACING: CPT | Mod: 59

## 2024-02-24 PROCEDURE — 47562 LAPAROSCOPIC CHOLECYSTECTOMY: CPT | Performed by: SURGERY

## 2024-02-24 PROCEDURE — 83605 ASSAY OF LACTIC ACID: CPT | Performed by: PHYSICIAN ASSISTANT

## 2024-02-24 PROCEDURE — 83690 ASSAY OF LIPASE: CPT | Performed by: EMERGENCY MEDICINE

## 2024-02-24 PROCEDURE — 99204 OFFICE O/P NEW MOD 45 MIN: CPT | Mod: 57 | Performed by: SURGERY

## 2024-02-24 RX ORDER — ACETAMINOPHEN 325 MG/1
650 TABLET ORAL EVERY 8 HOURS PRN
Status: DISCONTINUED | OUTPATIENT
Start: 2024-02-24 | End: 2024-02-25 | Stop reason: HOSPADM

## 2024-02-24 RX ORDER — NALOXONE HYDROCHLORIDE 0.4 MG/ML
0.4 INJECTION, SOLUTION INTRAMUSCULAR; INTRAVENOUS; SUBCUTANEOUS
Status: DISCONTINUED | OUTPATIENT
Start: 2024-02-24 | End: 2024-02-25 | Stop reason: HOSPADM

## 2024-02-24 RX ORDER — ONDANSETRON 4 MG/1
4 TABLET, ORALLY DISINTEGRATING ORAL EVERY 6 HOURS PRN
Status: DISCONTINUED | OUTPATIENT
Start: 2024-02-24 | End: 2024-02-25 | Stop reason: HOSPADM

## 2024-02-24 RX ORDER — PROPOFOL 10 MG/ML
INJECTION, EMULSION INTRAVENOUS CONTINUOUS PRN
Status: DISCONTINUED | OUTPATIENT
Start: 2024-02-24 | End: 2024-02-24

## 2024-02-24 RX ORDER — CEFTRIAXONE 2 G/1
2 INJECTION, POWDER, FOR SOLUTION INTRAMUSCULAR; INTRAVENOUS EVERY 24 HOURS
Status: DISCONTINUED | OUTPATIENT
Start: 2024-02-24 | End: 2024-02-25 | Stop reason: HOSPADM

## 2024-02-24 RX ORDER — MORPHINE SULFATE 4 MG/ML
4 INJECTION, SOLUTION INTRAMUSCULAR; INTRAVENOUS
Status: DISCONTINUED | OUTPATIENT
Start: 2024-02-24 | End: 2024-02-24

## 2024-02-24 RX ORDER — ONDANSETRON 2 MG/ML
4 INJECTION INTRAMUSCULAR; INTRAVENOUS EVERY 6 HOURS PRN
Status: DISCONTINUED | OUTPATIENT
Start: 2024-02-24 | End: 2024-02-25 | Stop reason: HOSPADM

## 2024-02-24 RX ORDER — AMLODIPINE BESYLATE 10 MG/1
10 TABLET ORAL DAILY
COMMUNITY

## 2024-02-24 RX ORDER — HYDROMORPHONE HCL IN WATER/PF 6 MG/30 ML
0.4 PATIENT CONTROLLED ANALGESIA SYRINGE INTRAVENOUS EVERY 5 MIN PRN
Status: DISCONTINUED | OUTPATIENT
Start: 2024-02-24 | End: 2024-02-24 | Stop reason: HOSPADM

## 2024-02-24 RX ORDER — BUPIVACAINE HYDROCHLORIDE AND EPINEPHRINE 5; 5 MG/ML; UG/ML
INJECTION, SOLUTION PERINEURAL PRN
Status: DISCONTINUED | OUTPATIENT
Start: 2024-02-24 | End: 2024-02-24 | Stop reason: HOSPADM

## 2024-02-24 RX ORDER — ONDANSETRON 2 MG/ML
4 INJECTION INTRAMUSCULAR; INTRAVENOUS EVERY 30 MIN PRN
Status: COMPLETED | OUTPATIENT
Start: 2024-02-24 | End: 2024-02-24

## 2024-02-24 RX ORDER — NALOXONE HYDROCHLORIDE 0.4 MG/ML
0.2 INJECTION, SOLUTION INTRAMUSCULAR; INTRAVENOUS; SUBCUTANEOUS
Status: DISCONTINUED | OUTPATIENT
Start: 2024-02-24 | End: 2024-02-25 | Stop reason: HOSPADM

## 2024-02-24 RX ORDER — CEFAZOLIN SODIUM/WATER 2 G/20 ML
2 SYRINGE (ML) INTRAVENOUS SEE ADMIN INSTRUCTIONS
Status: DISCONTINUED | OUTPATIENT
Start: 2024-02-24 | End: 2024-02-24 | Stop reason: HOSPADM

## 2024-02-24 RX ORDER — LIDOCAINE HYDROCHLORIDE 20 MG/ML
INJECTION, SOLUTION INFILTRATION; PERINEURAL PRN
Status: DISCONTINUED | OUTPATIENT
Start: 2024-02-24 | End: 2024-02-24

## 2024-02-24 RX ORDER — PROPOFOL 10 MG/ML
INJECTION, EMULSION INTRAVENOUS PRN
Status: DISCONTINUED | OUTPATIENT
Start: 2024-02-24 | End: 2024-02-24

## 2024-02-24 RX ORDER — DEXAMETHASONE SODIUM PHOSPHATE 4 MG/ML
INJECTION, SOLUTION INTRA-ARTICULAR; INTRALESIONAL; INTRAMUSCULAR; INTRAVENOUS; SOFT TISSUE PRN
Status: DISCONTINUED | OUTPATIENT
Start: 2024-02-24 | End: 2024-02-24

## 2024-02-24 RX ORDER — GABAPENTIN 300 MG/1
300 CAPSULE ORAL 3 TIMES DAILY
Status: DISCONTINUED | OUTPATIENT
Start: 2024-02-25 | End: 2024-02-25 | Stop reason: HOSPADM

## 2024-02-24 RX ORDER — LABETALOL HYDROCHLORIDE 5 MG/ML
10 INJECTION, SOLUTION INTRAVENOUS
Status: DISCONTINUED | OUTPATIENT
Start: 2024-02-24 | End: 2024-02-24 | Stop reason: HOSPADM

## 2024-02-24 RX ORDER — FENTANYL CITRATE 50 UG/ML
50 INJECTION, SOLUTION INTRAMUSCULAR; INTRAVENOUS EVERY 5 MIN PRN
Status: DISCONTINUED | OUTPATIENT
Start: 2024-02-24 | End: 2024-02-24

## 2024-02-24 RX ORDER — SODIUM CHLORIDE, SODIUM LACTATE, POTASSIUM CHLORIDE, CALCIUM CHLORIDE 600; 310; 30; 20 MG/100ML; MG/100ML; MG/100ML; MG/100ML
INJECTION, SOLUTION INTRAVENOUS CONTINUOUS
Status: DISCONTINUED | OUTPATIENT
Start: 2024-02-24 | End: 2024-02-24 | Stop reason: HOSPADM

## 2024-02-24 RX ORDER — NALOXONE HYDROCHLORIDE 0.4 MG/ML
0.1 INJECTION, SOLUTION INTRAMUSCULAR; INTRAVENOUS; SUBCUTANEOUS
Status: DISCONTINUED | OUTPATIENT
Start: 2024-02-24 | End: 2024-02-24 | Stop reason: HOSPADM

## 2024-02-24 RX ORDER — ONDANSETRON 2 MG/ML
4 INJECTION INTRAMUSCULAR; INTRAVENOUS EVERY 30 MIN PRN
Status: DISCONTINUED | OUTPATIENT
Start: 2024-02-24 | End: 2024-02-24 | Stop reason: HOSPADM

## 2024-02-24 RX ORDER — BUPROPION HYDROCHLORIDE 150 MG/1
150 TABLET ORAL EVERY MORNING
Status: DISCONTINUED | OUTPATIENT
Start: 2024-02-25 | End: 2024-02-25 | Stop reason: HOSPADM

## 2024-02-24 RX ORDER — MAGNESIUM HYDROXIDE 1200 MG/15ML
LIQUID ORAL PRN
Status: DISCONTINUED | OUTPATIENT
Start: 2024-02-24 | End: 2024-02-24 | Stop reason: HOSPADM

## 2024-02-24 RX ORDER — IOPAMIDOL 755 MG/ML
500 INJECTION, SOLUTION INTRAVASCULAR ONCE
Status: COMPLETED | OUTPATIENT
Start: 2024-02-24 | End: 2024-02-24

## 2024-02-24 RX ORDER — ZOLPIDEM TARTRATE 10 MG/1
10 TABLET ORAL
COMMUNITY

## 2024-02-24 RX ORDER — FENTANYL CITRATE 50 UG/ML
25 INJECTION, SOLUTION INTRAMUSCULAR; INTRAVENOUS EVERY 5 MIN PRN
Status: DISCONTINUED | OUTPATIENT
Start: 2024-02-24 | End: 2024-02-24

## 2024-02-24 RX ORDER — ONDANSETRON 4 MG/1
4 TABLET, ORALLY DISINTEGRATING ORAL EVERY 30 MIN PRN
Status: DISCONTINUED | OUTPATIENT
Start: 2024-02-24 | End: 2024-02-24 | Stop reason: HOSPADM

## 2024-02-24 RX ORDER — FENTANYL CITRATE 50 UG/ML
INJECTION, SOLUTION INTRAMUSCULAR; INTRAVENOUS PRN
Status: DISCONTINUED | OUTPATIENT
Start: 2024-02-24 | End: 2024-02-24

## 2024-02-24 RX ORDER — HYDROMORPHONE HCL IN WATER/PF 6 MG/30 ML
0.2 PATIENT CONTROLLED ANALGESIA SYRINGE INTRAVENOUS EVERY 5 MIN PRN
Status: DISCONTINUED | OUTPATIENT
Start: 2024-02-24 | End: 2024-02-24 | Stop reason: HOSPADM

## 2024-02-24 RX ORDER — MORPHINE SULFATE 4 MG/ML
4 INJECTION, SOLUTION INTRAMUSCULAR; INTRAVENOUS
Status: COMPLETED | OUTPATIENT
Start: 2024-02-24 | End: 2024-02-24

## 2024-02-24 RX ORDER — BUPIVACAINE HYDROCHLORIDE AND EPINEPHRINE 5; 5 MG/ML; UG/ML
1-30 INJECTION, SOLUTION EPIDURAL; INTRACAUDAL; PERINEURAL ONCE
Status: DISCONTINUED | OUTPATIENT
Start: 2024-02-24 | End: 2024-02-25

## 2024-02-24 RX ORDER — KETOROLAC TROMETHAMINE 15 MG/ML
15 INJECTION, SOLUTION INTRAMUSCULAR; INTRAVENOUS ONCE
Status: DISCONTINUED | OUTPATIENT
Start: 2024-02-24 | End: 2024-02-24

## 2024-02-24 RX ORDER — HYDROMORPHONE HCL IN WATER/PF 6 MG/30 ML
0.2 PATIENT CONTROLLED ANALGESIA SYRINGE INTRAVENOUS
Status: DISCONTINUED | OUTPATIENT
Start: 2024-02-24 | End: 2024-02-25 | Stop reason: HOSPADM

## 2024-02-24 RX ORDER — INDOCYANINE GREEN AND WATER 25 MG
2.5 KIT INJECTION ONCE
Status: COMPLETED | OUTPATIENT
Start: 2024-02-24 | End: 2024-02-24

## 2024-02-24 RX ORDER — LIDOCAINE 40 MG/G
CREAM TOPICAL
Status: DISCONTINUED | OUTPATIENT
Start: 2024-02-24 | End: 2024-02-25 | Stop reason: HOSPADM

## 2024-02-24 RX ORDER — LIDOCAINE 40 MG/G
CREAM TOPICAL
Status: DISCONTINUED | OUTPATIENT
Start: 2024-02-24 | End: 2024-02-24 | Stop reason: HOSPADM

## 2024-02-24 RX ORDER — HYDROMORPHONE HCL IN WATER/PF 6 MG/30 ML
0.4 PATIENT CONTROLLED ANALGESIA SYRINGE INTRAVENOUS
Status: DISCONTINUED | OUTPATIENT
Start: 2024-02-24 | End: 2024-02-25 | Stop reason: HOSPADM

## 2024-02-24 RX ORDER — ONDANSETRON 2 MG/ML
INJECTION INTRAMUSCULAR; INTRAVENOUS PRN
Status: DISCONTINUED | OUTPATIENT
Start: 2024-02-24 | End: 2024-02-24

## 2024-02-24 RX ORDER — GABAPENTIN 300 MG/1
300 CAPSULE ORAL 3 TIMES DAILY
COMMUNITY

## 2024-02-24 RX ORDER — MORPHINE SULFATE 4 MG/ML
4 INJECTION, SOLUTION INTRAMUSCULAR; INTRAVENOUS ONCE
Status: COMPLETED | OUTPATIENT
Start: 2024-02-24 | End: 2024-02-24

## 2024-02-24 RX ORDER — OXYCODONE HYDROCHLORIDE 5 MG/1
5 TABLET ORAL EVERY 4 HOURS PRN
Status: DISCONTINUED | OUTPATIENT
Start: 2024-02-24 | End: 2024-02-25 | Stop reason: HOSPADM

## 2024-02-24 RX ORDER — BUPROPION HYDROCHLORIDE 150 MG/1
150 TABLET ORAL EVERY MORNING
COMMUNITY

## 2024-02-24 RX ORDER — CEFAZOLIN SODIUM/WATER 2 G/20 ML
2 SYRINGE (ML) INTRAVENOUS
Status: COMPLETED | OUTPATIENT
Start: 2024-02-24 | End: 2024-02-24

## 2024-02-24 RX ORDER — AMOXICILLIN 250 MG
1 CAPSULE ORAL 2 TIMES DAILY PRN
Status: DISCONTINUED | OUTPATIENT
Start: 2024-02-24 | End: 2024-02-25 | Stop reason: HOSPADM

## 2024-02-24 RX ORDER — AMOXICILLIN 250 MG
2 CAPSULE ORAL 2 TIMES DAILY PRN
Status: DISCONTINUED | OUTPATIENT
Start: 2024-02-24 | End: 2024-02-25 | Stop reason: HOSPADM

## 2024-02-24 RX ADMIN — ROCURONIUM BROMIDE 5 MG: 50 INJECTION, SOLUTION INTRAVENOUS at 17:21

## 2024-02-24 RX ADMIN — MORPHINE SULFATE 4 MG: 4 INJECTION, SOLUTION INTRAMUSCULAR; INTRAVENOUS at 13:22

## 2024-02-24 RX ADMIN — PHENYLEPHRINE HYDROCHLORIDE 100 MCG: 10 INJECTION INTRAVENOUS at 17:33

## 2024-02-24 RX ADMIN — SUGAMMADEX 200 MG: 100 INJECTION, SOLUTION INTRAVENOUS at 18:30

## 2024-02-24 RX ADMIN — INDOCYANINE GREEN AND WATER 2.5 MG: KIT at 16:06

## 2024-02-24 RX ADMIN — SODIUM CHLORIDE, POTASSIUM CHLORIDE, SODIUM LACTATE AND CALCIUM CHLORIDE: 600; 310; 30; 20 INJECTION, SOLUTION INTRAVENOUS at 16:06

## 2024-02-24 RX ADMIN — PROPOFOL 75 MCG/KG/MIN: 10 INJECTION, EMULSION INTRAVENOUS at 17:27

## 2024-02-24 RX ADMIN — SODIUM CHLORIDE 1000 ML: 9 INJECTION, SOLUTION INTRAVENOUS at 10:00

## 2024-02-24 RX ADMIN — ONDANSETRON 4 MG: 2 INJECTION INTRAMUSCULAR; INTRAVENOUS at 10:09

## 2024-02-24 RX ADMIN — ONDANSETRON 4 MG: 2 INJECTION INTRAMUSCULAR; INTRAVENOUS at 15:17

## 2024-02-24 RX ADMIN — SODIUM CHLORIDE, POTASSIUM CHLORIDE, SODIUM LACTATE AND CALCIUM CHLORIDE: 600; 310; 30; 20 INJECTION, SOLUTION INTRAVENOUS at 17:34

## 2024-02-24 RX ADMIN — CEFTRIAXONE 2 G: 2 INJECTION, POWDER, FOR SOLUTION INTRAMUSCULAR; INTRAVENOUS at 21:29

## 2024-02-24 RX ADMIN — Medication 60 MG: at 17:21

## 2024-02-24 RX ADMIN — FENTANYL CITRATE 100 MCG: 50 INJECTION INTRAMUSCULAR; INTRAVENOUS at 17:21

## 2024-02-24 RX ADMIN — Medication 2 G: at 17:15

## 2024-02-24 RX ADMIN — ONDANSETRON 4 MG: 2 INJECTION INTRAMUSCULAR; INTRAVENOUS at 18:25

## 2024-02-24 RX ADMIN — PANTOPRAZOLE SODIUM 40 MG: 40 INJECTION, POWDER, FOR SOLUTION INTRAVENOUS at 21:23

## 2024-02-24 RX ADMIN — LIDOCAINE HYDROCHLORIDE 50 MG: 20 INJECTION, SOLUTION INFILTRATION; PERINEURAL at 17:21

## 2024-02-24 RX ADMIN — DEXAMETHASONE SODIUM PHOSPHATE 4 MG: 4 INJECTION, SOLUTION INTRA-ARTICULAR; INTRALESIONAL; INTRAMUSCULAR; INTRAVENOUS; SOFT TISSUE at 17:21

## 2024-02-24 RX ADMIN — PHENYLEPHRINE HYDROCHLORIDE 200 MCG: 10 INJECTION INTRAVENOUS at 17:39

## 2024-02-24 RX ADMIN — ONDANSETRON 4 MG: 2 INJECTION INTRAMUSCULAR; INTRAVENOUS at 12:02

## 2024-02-24 RX ADMIN — ROCURONIUM BROMIDE 45 MG: 50 INJECTION, SOLUTION INTRAVENOUS at 17:27

## 2024-02-24 RX ADMIN — IOPAMIDOL 88 ML: 755 INJECTION, SOLUTION INTRAVENOUS at 12:41

## 2024-02-24 RX ADMIN — PHENYLEPHRINE HYDROCHLORIDE 200 MCG: 10 INJECTION INTRAVENOUS at 18:10

## 2024-02-24 RX ADMIN — MORPHINE SULFATE 4 MG: 4 INJECTION, SOLUTION INTRAMUSCULAR; INTRAVENOUS at 15:17

## 2024-02-24 RX ADMIN — SODIUM CHLORIDE 500 ML: 9 INJECTION, SOLUTION INTRAVENOUS at 12:22

## 2024-02-24 RX ADMIN — SODIUM CHLORIDE 62 ML: 9 INJECTION, SOLUTION INTRAVENOUS at 12:41

## 2024-02-24 RX ADMIN — PROPOFOL 140 MG: 10 INJECTION, EMULSION INTRAVENOUS at 17:21

## 2024-02-24 RX ADMIN — HYDROMORPHONE HYDROCHLORIDE 0.5 MG: 1 INJECTION, SOLUTION INTRAMUSCULAR; INTRAVENOUS; SUBCUTANEOUS at 17:58

## 2024-02-24 ASSESSMENT — COLUMBIA-SUICIDE SEVERITY RATING SCALE - C-SSRS
2. HAVE YOU ACTUALLY HAD ANY THOUGHTS OF KILLING YOURSELF IN THE PAST MONTH?: NO
1. IN THE PAST MONTH, HAVE YOU WISHED YOU WERE DEAD OR WISHED YOU COULD GO TO SLEEP AND NOT WAKE UP?: NO
6. HAVE YOU EVER DONE ANYTHING, STARTED TO DO ANYTHING, OR PREPARED TO DO ANYTHING TO END YOUR LIFE?: NO

## 2024-02-24 ASSESSMENT — ACTIVITIES OF DAILY LIVING (ADL)
ADLS_ACUITY_SCORE: 35

## 2024-02-24 ASSESSMENT — LIFESTYLE VARIABLES: TOBACCO_USE: 1

## 2024-02-24 ASSESSMENT — ENCOUNTER SYMPTOMS: DYSRHYTHMIAS: 0

## 2024-02-24 NOTE — PHARMACY-ADMISSION MEDICATION HISTORY
Pharmacist Admission Medication History    Admission medication history is complete. The information provided in this note is only as accurate as the sources available at the time of the update.    Information Source(s): Patient and CareEverywhere/SureScripts via in-person    Pertinent Information: No recent refill hx for Amlodipine but pt confirms dosing.    Changes made to PTA medication list:  Added: Wellbutrin XL, doing for Amlodipine, Gabapentin & Ambien.  Deleted: ASA, Lipitor, Voltaren gel, Benadryl, Iron, Folgard & Duplicate Zoloft.  Changed: None    Allergies reviewed with patient and updates made in EHR: yes    Medication History Completed By: Winter Montalvo PharmD 2/24/2024 3:09 PM    PTA Med List   Medication Sig Last Dose    amLODIPine (NORVASC) 10 MG tablet Take 10 mg by mouth daily Couple weeks ago    buPROPion (WELLBUTRIN XL) 150 MG 24 hr tablet Take 150 mg by mouth every morning 2/23/2024    gabapentin (NEURONTIN) 300 MG capsule Take 300 mg by mouth 3 times daily 2/23/2024 at pm    sertraline (ZOLOFT) 100 MG tablet Take 1.5 tablets by mouth daily 2/22/2024    zolpidem (AMBIEN) 10 MG tablet Take 10 mg by mouth nightly as needed for sleep 2/23/2024 at hs

## 2024-02-24 NOTE — ED NOTES
[facility]  ED Nurse Handoff Report    ED Chief complaint: Chest Pain  . ED Diagnosis:   Final diagnoses:   Intractable upper abdominal pain   Symptomatic cholelithiasis   Chest pain, unspecified type   Nausea and vomiting, unspecified vomiting type   Hypertension, unspecified type   Hepatic steatosis       Allergies: No Known Allergies    Code Status: Full Code    Activity level - Baseline/Home:  independent.  Activity Level - Current:   standby.   Lift room needed: No.   Bariatric: No   Needed: No   Isolation: No.   Infection: Not Applicable.     Respiratory status: Room air    Vital Signs (within 30 minutes):   Vitals:    02/24/24 1230 02/24/24 1330 02/24/24 1400 02/24/24 1430   BP:  (!) 159/96 (!) 144/112    Pulse:  87 66    Resp:       Temp:       TempSrc:       SpO2: 100% 100% 92% 100%       Cardiac Rhythm:  ,      Pain level:    Patient confused: No.   Patient Falls Risk: activity supervised.   Elimination Status: Has voided     Patient Report - Initial Complaint: Neel Fuentes is a 59 year old male who presents with abdominal pain and vomiting for the last 2 to 3 days follow-up with chest pain that started last night.  He has abdominal pain is localized to the right upper quadrant and is constant.  He has never had pain like this before.  He has associated nausea with vomiting.  He attributes the vomiting to starting sertraline which is a new medicine for him recently, however he was on it in the past without difficulty.  He stopped taking it yesterday.  Patient also reports he had an about 3-4 episodes of chest pain last night, which comes and goes, lasting about 10-15 minute before it resolves. He notes he has had vomiting for the last 2 days, as well as diarrhea intermittently. Endorses chills, sweats, shortness of breath, and abdominal pain in the right upper quadrant. Here in the ED, endorses abdominal pain but denies chest pain. Denies difficulty urinating.  Patient  "reports history of hyperlipidemia and hypertension, for which he was on medication previously. He states he discontinued these as he was \"taking too much medicine\". Also notes history of diabetes, stating this has resolved. Patient states he uses medical cannabis, and also reports recreational marijuana use. Notes history of alcohol use in the past but denies current use. Denies tobacco use. Endorses history of gastric bypass 25 years ago with no complications. Denies history of cholecystectomy, liver issues, STEMI, or heart disease.   Focused Assessment:   GastrointestinalGastrointestinal WDL: .WDL except; GI symptomsGI Signs/Symptoms: nausea; diarrhea; abdominal discomfortAbdominal Appearance: rounded; nondistendedAbdominal Palpation: RUQRUQ Abdominal Palpation: tenderNausea/Vomiting Signs/Symptoms: nausea intermittent; dry-heaving; emesisAdditional Documentation: Nausea/Vomiting Signs/Symptoms (Row)      1100  Cardiac  ML    Cardiac (Adult)Cardiac WDL: WDL      1100  Respiratory  ML    RespiratoryAirway WDL: WDL  Respiratory WDLRespiratory WDL: WDL           Abnormal Results:   Labs Ordered and Resulted from Time of ED Arrival to Time of ED Departure   BASIC METABOLIC PANEL - Abnormal       Result Value    Sodium 139      Potassium 4.2      Chloride 100      Carbon Dioxide (CO2) 17 (*)     Anion Gap 22 (*)     Urea Nitrogen 14.7      Creatinine 0.83      GFR Estimate >90      Calcium 10.2 (*)     Glucose 152 (*)    CBC WITH PLATELETS AND DIFFERENTIAL - Abnormal    WBC Count 7.4      RBC Count 4.47      Hemoglobin 13.5      Hematocrit 40.2      MCV 90      MCH 30.2      MCHC 33.6      RDW 16.8 (*)     Platelet Count 370      % Neutrophils 60      % Lymphocytes 21      % Monocytes 18      % Eosinophils 0      % Basophils 1      % Immature Granulocytes 0      NRBCs per 100 WBC 0      Absolute Neutrophils 4.5      Absolute Lymphocytes 1.5      Absolute Monocytes 1.3      Absolute Eosinophils 0.0      Absolute " Basophils 0.1      Absolute Immature Granulocytes 0.0      Absolute NRBCs 0.0     HEPATIC FUNCTION PANEL - Abnormal    Protein Total 8.6 (*)     Albumin 4.8      Bilirubin Total 1.4 (*)     Alkaline Phosphatase 216 (*)      (*)     ALT 93 (*)     Bilirubin Direct 0.59 (*)    LACTIC ACID WHOLE BLOOD - Abnormal    Lactic Acid 2.7 (*)    LACTIC ACID WHOLE BLOOD - Abnormal    Lactic Acid 2.6 (*)    TROPONIN T, HIGH SENSITIVITY - Normal    Troponin T, High Sensitivity 13     LIPASE - Normal    Lipase 22     ETHYL ALCOHOL LEVEL - Normal    Alcohol ethyl <0.01     TROPONIN T, HIGH SENSITIVITY - Normal    Troponin T, High Sensitivity 10     LACTIC ACID WHOLE BLOOD        CT Abdomen Pelvis w Contrast   Final Result   IMPRESSION:    1.  Similar small volume biliary sludge and gallstones in the moderately distended gallbladder.   2.  Hepatic steatosis.   3.  Gastric bypass.      Chest XR,  PA & LAT   Final Result   IMPRESSION: Negative chest.      US Abdomen Limited   Final Result   IMPRESSION:   1.  Tiny gallstones and gallbladder sludge within distended gallbladder. No cholecystitis nor bile duct dilatation.   2.  Diffuse hepatic steatosis.                Treatments provided: See MAR  Family Comments: Pt will contact  OBS brochure/video discussed/provided to patient:  Yes  ED Medications:   Medications   ondansetron (ZOFRAN) injection 4 mg (4 mg Intravenous $Given 2/24/24 1202)   sodium chloride 0.9% BOLUS 1,000 mL (0 mLs Intravenous Stopped 2/24/24 1122)   morphine (PF) injection 4 mg (0 mg Intravenous Return to Cabinet 2/24/24 1158)   sodium chloride 0.9% BOLUS 500 mL (0 mLs Intravenous Stopped 2/24/24 1344)   morphine (PF) injection 4 mg (4 mg Intravenous $Given 2/24/24 1322)   CT Scan Flush (62 mLs Intravenous $Given 2/24/24 1241)   iopamidol (ISOVUE-370) solution 500 mL (88 mLs Intravenous $Given 2/24/24 1241)       Drips infusing:  No  For the majority of the shift this patient was Green.   Interventions  performed were NA.    Sepsis treatment initiated: Yes    Per the ED Provider, Time Zero for severe sepsis or septic shock is:  1141    3 Hour Severe Sepsis Bundle Completion:  1. Initial Lactic Acid Result:   Recent Labs   Lab Test 02/24/24  1320 02/24/24  1130   LACT 2.6* 2.7*     2. Blood Cultures before Antibiotics: NA - Provider discretion, favor volume depletion vs sepsis - elevated lactic.  3. Broad Spectrum Antibiotics Administered:     Anti-infectives (From now, onward)      None          4. 1500 ml of IV fluids have been given so far      6 Hour Severe Sepsis Bundle Completion:    1. Repeat Lactic Acid Level: Last result   Lab Results   Component Value Date    LACT 2.6 (H) 02/24/2024     2. Patient currently on Vasopressors =  No    Cares/treatment/interventions/medications to be completed following ED care: Pain mgmt, general surgery consult, maintenance fluids.    ED Nurse Name: Alysa Estrella RN  2:33 PM

## 2024-02-24 NOTE — ANESTHESIA PROCEDURE NOTES
Airway       Patient location during procedure: OR       Procedure Start/Stop Times: 2/24/2024 5:23 PM  Staff -        CRNA: Tc Boroks APRN CRNA       Performed By: CRNAIndications and Patient Condition       Indications for airway management: asiya-procedural and airway protection       Induction type:intravenous       Mask difficulty assessment: 1 - vent by mask    Final Airway Details       Final airway type: endotracheal airway       Successful airway: ETT - single  Endotracheal Airway Details        ETT size (mm): 7.0       Cuffed: yes       Successful intubation technique: direct laryngoscopy       DL Blade Type: MAC 3       Grade View of Cords: 2       Adjucts: stylet       Position: Right       Measured from: gums/teeth       Secured at (cm): 24       Bite block used: None    Post intubation assessment        Placement verified by: capnometry, equal breath sounds and chest rise        Number of attempts at approach: 1       Secured with: tape       Ease of procedure: easy       Dentition: Intact    Medication(s) Administered   Medication Administration Time: 2/24/2024 5:23 PM

## 2024-02-24 NOTE — ANESTHESIA PREPROCEDURE EVALUATION
Anesthesia Pre-Procedure Evaluation    Patient: Neel Fuentes   MRN: [id number] : 1965        Procedure : Procedure(s):  CHOLECYSTECTOMY, ROBOT-ASSISTED          Past Medical History:   Diagnosis Date    Diabetes (H)     hgb A1C below 6.9 since Gastric Bypass       History reviewed. No pertinent surgical history.   No Known Allergies   Social History     Tobacco Use    Smoking status: Some Days    Smokeless tobacco: Not on file    Tobacco comments:     Cigars   Substance Use Topics    Alcohol use: Yes      Wt Readings from Last 1 Encounters:   22 81.6 kg (180 lb)        Anesthesia Evaluation   Pt has had prior anesthetic. Type: General.    No history of anesthetic complications       ROS/MED HX  ENT/Pulmonary:     (+)                tobacco use,                     (-) asthma and recent URI   Neurologic:  - neg neurologic ROS     Cardiovascular:     (+) Dyslipidemia hypertension- -   -  - -                                   (-) arrhythmias and valvular problems/murmurs   METS/Exercise Tolerance:     Hematologic:       Musculoskeletal:       GI/Hepatic: Comment: Hx gastric bypass    (+)          cholecystitis/cholelithiasis,       (-) GERD   Renal/Genitourinary:  - neg Renal ROS     Endo:     (+)  type II DM (no meds),                    Psychiatric/Substance Use:     (+)   alcohol abuse (past)  Recreational drug usage: Cannabis.    Infectious Disease:       Malignancy:       Other:            Physical Exam    Airway        Mallampati: III   TM distance: > 3 FB   Neck ROM: full   Mouth opening: > 3 cm    Respiratory Devices and Support         Dental       (+) Minor Abnormalities - some fillings, tiny chips      Cardiovascular   cardiovascular exam normal       Rhythm and rate: regular and normal     Pulmonary   pulmonary exam normal        breath sounds clear to auscultation           OUTSIDE LABS:  CBC:   Lab Results   Component Value Date    WBC 7.4 2024    WBC 7.3 2023    HGB 13.5  "02/24/2024    HGB 10.0 (L) 08/29/2023    HCT 40.2 02/24/2024    HCT 30.0 (L) 08/29/2023     02/24/2024     08/29/2023     BMP:   Lab Results   Component Value Date     02/24/2024     08/29/2023    POTASSIUM 4.2 02/24/2024    POTASSIUM 4.3 08/29/2023    CHLORIDE 100 02/24/2024    CHLORIDE 105 08/29/2023    CO2 17 (L) 02/24/2024    CO2 20 (L) 08/29/2023    BUN 14.7 02/24/2024    BUN 12.5 08/29/2023    CR 0.83 02/24/2024    CR 0.67 08/29/2023     (H) 02/24/2024     (H) 08/29/2023     COAGS: No results found for: \"PTT\", \"INR\", \"FIBR\"  POC: No results found for: \"BGM\", \"HCG\", \"HCGS\"  HEPATIC:   Lab Results   Component Value Date    ALBUMIN 4.8 02/24/2024    PROTTOTAL 8.6 (H) 02/24/2024    ALT 93 (H) 02/24/2024     (H) 02/24/2024    ALKPHOS 216 (H) 02/24/2024    BILITOTAL 1.4 (H) 02/24/2024     OTHER:   Lab Results   Component Value Date    LACT 2.6 (H) 02/24/2024    MAYI 10.2 (H) 02/24/2024    LIPASE 22 02/24/2024    SED 17 08/29/2023       Anesthesia Plan    ASA Status:  2    NPO Status:  NPO Appropriate    Anesthesia Type: General.     - Airway: ETT   Induction: Intravenous.   Maintenance: Balanced.        Consents    Anesthesia Plan(s) and associated risks, benefits, and realistic alternatives discussed. Questions answered and patient/representative(s) expressed understanding.     - Discussed:     - Discussed with:  Patient      - Extended Intubation/Ventilatory Support Discussed: No.      - Patient is DNR/DNI Status: No     Use of blood products discussed: No .     Postoperative Care    Pain management: IV analgesics, Oral pain medications, Multi-modal analgesia.   PONV prophylaxis: Ondansetron (or other 5HT-3), Dexamethasone or Solumedrol     Comments:               Jaclyn Pearl MD    I have reviewed the pertinent notes and labs in the chart from the past 30 days and (re)examined the patient.  Any updates or changes from those notes are reflected in this note.   "    # Hypercalcemia: Highest Ca = 10.2 mg/dL in last 2 days, will monitor as appropriate    # Anion Gap Metabolic Acidosis: Highest Anion Gap = 22 mmol/L in last 2 days, will monitor and treat as appropriate

## 2024-02-24 NOTE — LETTER
[facility]  [address]  [facility] [address]  Phone: [phone]  Fax: [phone]    February 25, 2024        Neel Fuentes  [address] [place] [address]          To whom it may concern:    RE: Neel Fuentes    Patient had emergency surgery on 2/24/24. He will need to be off work until 3/11/24 for healing time.      Please contact me for questions or concerns.      Sincerely,        Marimar García MD  [facility]  [address]  [place], [place]  [phone]

## 2024-02-24 NOTE — ED PROVIDER NOTES
"  History     Chief Complaint:  Abdominal pain, vomiting, chest pain    The history is provided by the patient.      Neel Fuentes is a 59 year old male who presents with abdominal pain and vomiting for the last 2 to 3 days follow-up with chest pain that started last night.  He has abdominal pain is localized to the right upper quadrant and is constant.  He has never had pain like this before.  He has associated nausea with vomiting.  He attributes the vomiting to starting sertraline which is a new medicine for him recently, however he was on it in the past without difficulty.  He stopped taking it yesterday.  Patient also reports he had an about 3-4 episodes of chest pain last night, which comes and goes, lasting about 10-15 minute before it resolves. He notes he has had vomiting for the last 2 days, as well as diarrhea intermittently. Endorses chills, sweats, shortness of breath, and abdominal pain in the right upper quadrant. Here in the ED, endorses abdominal pain but denies chest pain. Denies difficulty urinating.  Patient reports history of hyperlipidemia and hypertension, for which he was on medication previously. He states he discontinued these as he was \"taking too much medicine\". Also notes history of diabetes, stating this has resolved. Patient states he uses medical cannabis, and also reports recreational marijuana use. Notes history of alcohol use in the past but denies current use. Denies tobacco use. Endorses history of gastric bypass 25 years ago with no complications. Denies history of cholecystectomy, liver issues, STEMI, or heart disease.    Independent Historian:   None - Patient Only    Review of External Notes:   Outpatient clinic notes reviewed.  Family medicine office visit reviewed from 2/12/2024, history of medication use reported by patient concurs with this note      Medications:    Medical cannabis   Amlodipine  Aspirin 81 mg  Atorvastatin  Bupropion  Gabapentin  Iohexol "   Sertraline  Tadalafil   Trazodone   Zolpidem     Past Medical History:    Alcohol abuse   Anemia  Anxiety  Chronic shoulder pain bilateral  Elevated liver enzymes  Essential hypertension  Hyperlipidemia   Insomnia   Major depression  Pes planus   Testicular hypofunction  Type 2 diabetes     Past Surgical History:    Cervical spine fusion  Arthroscopy of L shoulder   Gastric bypass   Shoulder surgery right  Shoulder surgery left x 3  Left ankle repair   Carpal tunnel release      Physical Exam   Patient Vitals for the past 24 hrs:   BP Temp Temp src Pulse Resp SpO2   02/24/24 1114 -- 97.8  F (36.6  C) Oral -- -- --   02/24/24 1100 -- -- -- -- -- 100 %   02/24/24 1050 (!) 170/103 -- -- 78 -- 100 %   02/24/24 1045 (!) 170/103 -- -- -- -- --   02/24/24 0950 -- -- -- -- -- 100 %   02/24/24 0945 -- -- -- -- -- 100 %   02/24/24 0940 (!) 169/91 -- -- 64 -- 100 %   02/24/24 0928 (!) 152/110 98.4  F (36.9  C) Temporal 72 20 99 %      Physical Exam  General: Adult male, uncomfortable appearing, sitting upright  Eyes: PERRL, Conjunctive within normal limits.  No scleral icterus.  ENT: Moist mucous membranes, oropharynx clear.   CV: Normal S1S2, no murmur, rub or gallop. Regular rate and rhythm.  Radial pulses palpable bilaterally.  Resp: Clear to auscultation bilaterally, no wheezes, rales or rhonchi. Normal respiratory effort.  GI: Abdomen is soft nondistended.  Right upper quadrant tenderness to palpation with positive Rosenbaum sign.  No palpable masses.   MSK: No edema. Nontender. Normal active range of motion.  Skin: Diaphoretic.  Warm.  No rashes or lesions or ecchymoses on visible skin.  Neuro: Alert and oriented. Responds appropriately to all questions and commands. No focal findings appreciated. Normal muscle tone.  Psych: Appropriate mood and affect.    Emergency Department Course   ECG  ECG taken at 0947, ECG read at 0952  Sinus rhythm with premature atrial complexes   Rate 66 bpm. MD interval 154 ms. QRS duration 78  ms. QT/QTc 436/457 ms. P-R-T axes 58 -24 22.     Imaging:  CT Abdomen Pelvis w Contrast   Final Result   IMPRESSION:    1.  Similar small volume biliary sludge and gallstones in the moderately distended gallbladder.   2.  Hepatic steatosis.   3.  Gastric bypass.      Chest XR,  PA & LAT   Final Result   IMPRESSION: Negative chest.      US Abdomen Limited   Final Result   IMPRESSION:   1.  Tiny gallstones and gallbladder sludge within distended gallbladder. No cholecystitis nor bile duct dilatation.   2.  Diffuse hepatic steatosis.               Laboratory:  Labs Ordered and Resulted from Time of ED Arrival to Time of ED Departure   BASIC METABOLIC PANEL - Abnormal       Result Value    Sodium 139      Potassium 4.2      Chloride 100      Carbon Dioxide (CO2) 17 (*)     Anion Gap 22 (*)     Urea Nitrogen 14.7      Creatinine 0.83      GFR Estimate >90      Calcium 10.2 (*)     Glucose 152 (*)    CBC WITH PLATELETS AND DIFFERENTIAL - Abnormal    WBC Count 7.4      RBC Count 4.47      Hemoglobin 13.5      Hematocrit 40.2      MCV 90      MCH 30.2      MCHC 33.6      RDW 16.8 (*)     Platelet Count 370      % Neutrophils 60      % Lymphocytes 21      % Monocytes 18      % Eosinophils 0      % Basophils 1      % Immature Granulocytes 0      NRBCs per 100 WBC 0      Absolute Neutrophils 4.5      Absolute Lymphocytes 1.5      Absolute Monocytes 1.3      Absolute Eosinophils 0.0      Absolute Basophils 0.1      Absolute Immature Granulocytes 0.0      Absolute NRBCs 0.0     HEPATIC FUNCTION PANEL - Abnormal    Protein Total 8.6 (*)     Albumin 4.8      Bilirubin Total 1.4 (*)     Alkaline Phosphatase 216 (*)      (*)     ALT 93 (*)     Bilirubin Direct 0.59 (*)    LACTIC ACID WHOLE BLOOD - Abnormal    Lactic Acid 2.7 (*)    LACTIC ACID WHOLE BLOOD - Abnormal    Lactic Acid 2.6 (*)    TROPONIN T, HIGH SENSITIVITY - Normal    Troponin T, High Sensitivity 13     LIPASE - Normal    Lipase 22     ETHYL ALCOHOL LEVEL -  Normal    Alcohol ethyl <0.01     TROPONIN T, HIGH SENSITIVITY - Normal    Troponin T, High Sensitivity 10     LACTIC ACID WHOLE BLOOD      Emergency Department Course & Assessments:    Interventions:  Medications   ondansetron (ZOFRAN) injection 4 mg (4 mg Intravenous $Given 2/24/24 1202)   morphine (PF) injection 4 mg (has no administration in time range)   sodium chloride 0.9% BOLUS 1,000 mL (0 mLs Intravenous Stopped 2/24/24 1122)   morphine (PF) injection 4 mg (0 mg Intravenous Return to Cabinet 2/24/24 1158)   sodium chloride 0.9% BOLUS 500 mL (500 mLs Intravenous $New Bag 2/24/24 1222)   CT Scan Flush (62 mLs Intravenous $Given 2/24/24 1241)   iopamidol (ISOVUE-370) solution 500 mL (88 mLs Intravenous $Given 2/24/24 1241)      Independent Interpretation (X-rays, CTs, rhythm strip):  None    Assessments/Consultations/Discussion of Management or Tests:   ED Course as of 02/24/24 1310   Sat Feb 24, 2024   0952 I evaluated the patient and obtained history as noted above.   1140 Rechecked. Patient is still in pain. Planning for admission.    I discussed the patient with Dr. García of general surgery regarding findings of ultrasound and general presentation.  I discussed the patient with Ava Bragg of the hospitalist service regarding admission.  She accepts for José Watson MD.    Social Determinants of Health affecting care:   None    Disposition:  The patient was admitted to the hospital under the care of Dr. Watson    Impression & Plan      Medical Decision Making:  Neel Fuentes is a 59-year-old male who presents emergency department with abdominal pain, nausea and vomiting for 2 to 3 days with chest pain that is been present since last night intermittently.  He has no signs of acute ischemia or injury on ECG with regards to the chest pain.  Troponin is stable not concerning the elevated.  With regards to abdominal pain that localizes to the epigastrium and right upper quadrant.  Multiple etiologies were  considered, gallbladder etiology seems most likely, perhaps evolving cholecystitis.  He has a distended gallbladder with sludge and cholelithiasis.  There are no current signs of cholecystitis and he is not febrile nor does he have leukocytosis.  He has slight elevation of bilirubin and liver function tests.  He also has hepatic steatosis which could explain his elevated liver function tests.  Also considered for complications related to his past gastric bypass.  No findings on imaging to suggest perforation or other intra-abdominal pathology such as obstruction.  He was given IV pain control in the emergency department but continued to be symptomatic and therefore will be admitted for ongoing monitoring, care and potential surgery.    Diagnosis:    ICD-10-CM    1. Intractable upper abdominal pain  R10.10       2. Symptomatic cholelithiasis  K80.20       3. Chest pain, unspecified type  R07.9       4. Nausea and vomiting, unspecified vomiting type  R11.2       5. Hypertension, unspecified type  I10       6. Hepatic steatosis  K76.0               Scribe Disclosure:  Florence SHAH, am serving as a scribe at 10:26 AM on 2/24/2024 to document services personally performed by Taylor Gracia MD based on my observations and the provider's statements to me.    2/24/2024   Taylor Gracia MD Jonkman, Tracy Dianne, MD  02/24/24 3315

## 2024-02-24 NOTE — ED TRIAGE NOTES
Chest pain that started in the middle of the night. N/V. Denies heart history and blood thinners.

## 2024-02-24 NOTE — CONSULTS
General Surgery Consultation    Neel Fuentes MRN# [id number]   Age: 59 year old YOB: 1965     Date of Admission:  2/24/2024    Reason for consult:            Cholecystitis       Requesting physician:            Taylor Gracia MD                Assessment and Plan:   Assessment:   Neel Fuentes is a 59 year old male with acute cholecystitis.   LFTs are mildly elevated but actually decreased from routine lab check 2 weeks ago. History of alcohol use, and has been sober 2 weeks. Suspect this is the etiology. Normal CBD size, do not suspect choledocholithiasis.  With initial complaints of chest pain (epigastric/lower chest), EKG and troponin x 2 have been normal. Chest pain likely 2/2 gallbladder. I have discussed this patient with Ava Sheehan PA-C hospitalist and no additional acute coronary syndrome workup would be recommended    Comorbidities:   has a past medical history of Diabetes (H).      Plan:   I have offered the patient a robotic cholecystectomy, today.    We have discussed the indication, alternatives, risks and expected recovery.  Specifically we have discussed incisions, scarring, postoperative infections, anesthesia, bleeding, open conversion, common bile duct injury, injury to intra-abdominal organs, retained common bile duct stone, bile leak, hernia, post cholecystectomy diarrhea, recovery, postoperative dietary restrictions and physical limitations.  We have discussed the recommended interventions and treatments for these complications.  All questions have been answered to the best of my ability.    He elects to proceed with surgery.   Will plan to discharge to home after surgery today unless complications arise. Discussed with patient if gallbladder gangrenous/necrotic may have to stay overnight in hospital   2 weeks off work due to strenuous physical nature of his job                 Chief Complaint:   Abdominal pain, right upper quadrant     History is obtained from the patient.          History of Present Illness:   eNel Fuentes is a 59 year old black male who presents with right upper quadrant abdominal pain for the past 3 days.  The pain is constant.  He has had similar pain in the past a few times but never this severe.    Positive for associated symptoms of nausea and vomiting. Had chills today.   No fevers.   States he started sertraline the day prior to the pain and nausea starting and stopped the sertraline as he thought that may be causing the vomiting.  Notes he has lost weight over the past couple months unintentionally.  Recently, less than 2 weeks ago, went to family medicine doctor for check up and had comprehensive lab tests which showed elevated LFTs (higher than today's admission numbers). Has a history of heavy alcohol drinking, has been sober for 2 weeks now.          Past Medical History:     Past Medical History:   Diagnosis Date    Diabetes (H)     hgb A1C below 6.9 since Gastric Bypass 1996   Alcoholism  Depression   Hyperlipidemia          Past Surgical History:   No past surgical history on file.  Laparoscopic Gastric bypass almost 30 years ago  Cspine fusion  Shoulder surgeries          Social History:     Social History     Tobacco Use    Smoking status: Some Days    Smokeless tobacco: Not on file    Tobacco comments:     Cigars   Substance Use Topics    Alcohol use: Yes   Quit smoking last year  Etoh use as above            Family History:     Family History   Problem Relation Age of Onset    Substance Abuse Father     Substance Abuse Brother             Allergies:   No Known Allergies          Medications:   No current facility-administered medications on file prior to encounter.  amLODIPine (NORVASC) 10 MG tablet, Take 5 mg by mouth  aspirin (ASA) 81 MG EC tablet, Take 1 tablet by mouth daily  atorvastatin (LIPITOR) 10 MG tablet, Take 10 mg by mouth daily  diclofenac (VOLTAREN) 1 % topical gel, APPLY 2GMS TO SKIN TWICE DAILY  DiphenhydrAMINE HCl (BENADRYL PO),   FEROSUL  325 (65 Fe) MG tablet,   folic acid-vit B6-vit B12 (FOLGARD) 0.8-10-0.115 MG TABS, Take by mouth daily (Patient not taking: Reported on 2/16/2023)  gabapentin (NEURONTIN) 300 MG capsule, Take 300 mg by mouth  sertraline (ZOLOFT) 100 MG tablet, Take 1.5 tablets by mouth daily  SERTRALINE HCL PO, Take by mouth daily  zolpidem (AMBIEN) 5 MG tablet, Take 5 mg by mouth               Review of Systems:   The 10 point review of systems is negative other than noted in the HPI.          Physical Exam:   BP (!) 159/96   Pulse 87   Temp 97.8  F (36.6  C) (Oral)   Resp 20   SpO2 100%   General - thin well nourished male., diaphoretic  HEENT:  Head normocephalic and atraumatic, pupils equal and round, conjunctivae clear, no  scleral icterus  Lungs: No dyspnea on RA  Heart: regular rate  Abdomen: soft, flat, non-distended with severe tenderness noted in the right upper quadrant  and central abdomen to light pressure. Non tender in the left and lower abdomen. Unable to assess for mass due to severe pain  Extremities: Warm without edema  Neurologic: nonfocal  Psychiatric: Mood and affect appropriate  Skin: Without lesions, rashes, or jaundice         Data:     WBC -   Lab Results   Component Value Date    WBC 7.4 02/24/2024       HgB -   Lab Results   Component Value Date    HGB 13.5 02/24/2024       Plt-   Lab Results   Component Value Date     02/24/2024       Liver Function Studies -   Recent Labs   Lab Test 02/24/24  0957   PROTTOTAL 8.6*   ALBUMIN 4.8   BILITOTAL 1.4*   ALKPHOS 216*   *   ALT 93*     LFTs at PCP office 2/12L  Bili 2.5  [address]ph[address] 191  Ast 406  Alt 101    Lipase-   Lab Results   Component Value Date    LIPASE 22 02/24/2024         Imaging:  All imaging studies reviewed by me.    Results for orders placed or performed during the hospital encounter of 02/24/24   US Abdomen Limited    Narrative    EXAM: US ABDOMEN LIMITED  LOCATION: [facility]  DATE: 2/24/2024    INDICATION:  Right upper quadrant abdominal pain.  COMPARISON: None.  TECHNIQUE: Limited abdominal ultrasound.    FINDINGS:    GALLBLADDER: Distended gallbladder with layering tiny gallstones and gallbladder sludge within the gallbladder lumen. No wall thickening nor pericholecystic fluid.    BILE DUCTS: No biliary dilatation. The common duct measures 3 mm.    LIVER: Increased echogenicity from diffuse fatty infiltration. No focal mass.    RIGHT KIDNEY: 11.3 cm. No hydronephrosis.    PANCREAS: The visualized portions are normal.    No ascites.      Impression    IMPRESSION:  1.  Tiny gallstones and gallbladder sludge within distended gallbladder. No cholecystitis nor bile duct dilatation.  2.  Diffuse hepatic steatosis.       Chest XR,  PA & LAT    Narrative    EXAM: XR CHEST 2 VIEWS  LOCATION: [facility]  DATE: 2/24/2024    INDICATION: Chest pain.  COMPARISON: None.      Impression    IMPRESSION: Negative chest.   CT Abdomen Pelvis w Contrast    Narrative    EXAM: CT ABDOMEN PELVIS W CONTRAST  LOCATION: [facility]  DATE: 2/24/2024    INDICATION: Abdominal pain  COMPARISON: Same-day abdominal ultrasound.  TECHNIQUE: CT scan of the abdomen and pelvis was performed following injection of IV contrast. Multiplanar reformats were obtained. Dose reduction techniques were used.  CONTRAST: 88mL Isovue 370    FINDINGS:   LOWER CHEST: Coronary atherosclerosis.    HEPATOBILIARY: Hepatic steatosis. Small volume biliary sludge and gallstones seen in the moderately distended gallbladder. No biliary duct dilation.    PANCREAS: Normal.    SPLEEN: Normal.    ADRENAL GLANDS: Normal.    KIDNEYS/BLADDER: No significant mass, stone, or hydronephrosis. The bladder is incompletely distended accentuating wall thickening.    BOWEL: Gastric bypass. No obstruction or inflammation. Normal appendix.    LYMPH NODES: Normal.    VASCULATURE: Unremarkable.    PELVIC ORGANS: Normal.    MUSCULOSKELETAL: Degenerative  changes of the spine and bilateral hips.      Impression    IMPRESSION:   1.  Similar small volume biliary sludge and gallstones in the moderately distended gallbladder.  2.  Hepatic steatosis.  3.  Gastric bypass.                     Marimar García MD

## 2024-02-25 VITALS
BODY MASS INDEX: 22.81 KG/M2 | WEIGHT: 150.5 LBS | HEIGHT: 68 IN | RESPIRATION RATE: 14 BRPM | HEART RATE: 79 BPM | OXYGEN SATURATION: 96 % | SYSTOLIC BLOOD PRESSURE: 129 MMHG | TEMPERATURE: 97.7 F | DIASTOLIC BLOOD PRESSURE: 75 MMHG

## 2024-02-25 LAB
ALBUMIN SERPL BCG-MCNC: 4 G/DL (ref 3.5–5.2)
ALP SERPL-CCNC: 197 U/L (ref 40–150)
ALT SERPL W P-5'-P-CCNC: 148 U/L (ref 0–70)
ANION GAP SERPL CALCULATED.3IONS-SCNC: 15 MMOL/L (ref 7–15)
AST SERPL W P-5'-P-CCNC: 355 U/L (ref 0–45)
BILIRUB SERPL-MCNC: 1.2 MG/DL
BUN SERPL-MCNC: 10.3 MG/DL (ref 6–20)
CALCIUM SERPL-MCNC: 8.9 MG/DL (ref 8.6–10)
CHLORIDE SERPL-SCNC: 101 MMOL/L (ref 98–107)
CREAT SERPL-MCNC: 0.76 MG/DL (ref 0.67–1.17)
DEPRECATED HCO3 PLAS-SCNC: 19 MMOL/L (ref 22–29)
EGFRCR SERPLBLD CKD-EPI 2021: >90 ML/MIN/1.73M2
GLUCOSE BLDC GLUCOMTR-MCNC: 125 MG/DL (ref 70–99)
GLUCOSE SERPL-MCNC: 138 MG/DL (ref 70–99)
HOLD SPECIMEN: NORMAL
POTASSIUM SERPL-SCNC: 3.9 MMOL/L (ref 3.4–5.3)
PROT SERPL-MCNC: 7.2 G/DL (ref 6.4–8.3)
SODIUM SERPL-SCNC: 135 MMOL/L (ref 135–145)

## 2024-02-25 PROCEDURE — 258N000003 HC RX IP 258 OP 636: Performed by: INTERNAL MEDICINE

## 2024-02-25 PROCEDURE — 82040 ASSAY OF SERUM ALBUMIN: CPT | Performed by: PHYSICIAN ASSISTANT

## 2024-02-25 PROCEDURE — G0378 HOSPITAL OBSERVATION PER HR: HCPCS

## 2024-02-25 PROCEDURE — 99207 PR APP CREDIT; MD BILLING SHARED VISIT: CPT | Mod: FS | Performed by: INTERNAL MEDICINE

## 2024-02-25 PROCEDURE — 36415 COLL VENOUS BLD VENIPUNCTURE: CPT | Performed by: PHYSICIAN ASSISTANT

## 2024-02-25 PROCEDURE — 96361 HYDRATE IV INFUSION ADD-ON: CPT

## 2024-02-25 PROCEDURE — 82962 GLUCOSE BLOOD TEST: CPT

## 2024-02-25 PROCEDURE — 250N000013 HC RX MED GY IP 250 OP 250 PS 637: Performed by: PHYSICIAN ASSISTANT

## 2024-02-25 PROCEDURE — 93005 ELECTROCARDIOGRAM TRACING: CPT

## 2024-02-25 PROCEDURE — 250N000011 HC RX IP 250 OP 636: Performed by: PHYSICIAN ASSISTANT

## 2024-02-25 PROCEDURE — 96376 TX/PRO/DX INJ SAME DRUG ADON: CPT

## 2024-02-25 RX ORDER — OXYCODONE HYDROCHLORIDE 5 MG/1
5 TABLET ORAL EVERY 4 HOURS PRN
Qty: 10 TABLET | Refills: 0 | Status: SHIPPED | OUTPATIENT
Start: 2024-02-25

## 2024-02-25 RX ORDER — SODIUM CHLORIDE 9 MG/ML
INJECTION, SOLUTION INTRAVENOUS CONTINUOUS
Status: DISCONTINUED | OUTPATIENT
Start: 2024-02-25 | End: 2024-02-25 | Stop reason: HOSPADM

## 2024-02-25 RX ADMIN — BUPROPION 150 MG: 150 TABLET, EXTENDED RELEASE ORAL at 08:52

## 2024-02-25 RX ADMIN — ONDANSETRON 4 MG: 2 INJECTION INTRAMUSCULAR; INTRAVENOUS at 06:21

## 2024-02-25 RX ADMIN — SODIUM CHLORIDE: 9 INJECTION, SOLUTION INTRAVENOUS at 10:00

## 2024-02-25 RX ADMIN — OXYCODONE HYDROCHLORIDE 5 MG: 5 TABLET ORAL at 06:12

## 2024-02-25 RX ADMIN — HYDROMORPHONE HYDROCHLORIDE 0.4 MG: 0.2 INJECTION, SOLUTION INTRAMUSCULAR; INTRAVENOUS; SUBCUTANEOUS at 06:59

## 2024-02-25 RX ADMIN — GABAPENTIN 300 MG: 300 CAPSULE ORAL at 08:52

## 2024-02-25 ASSESSMENT — ACTIVITIES OF DAILY LIVING (ADL)
ADLS_ACUITY_SCORE: 35
ADLS_ACUITY_SCORE: 24
ADLS_ACUITY_SCORE: 20
ADLS_ACUITY_SCORE: 35
ADLS_ACUITY_SCORE: 20
ADLS_ACUITY_SCORE: 35
ADLS_ACUITY_SCORE: 20
ADLS_ACUITY_SCORE: 35
ADLS_ACUITY_SCORE: 20
ADLS_ACUITY_SCORE: 24
ADLS_ACUITY_SCORE: 35
ADLS_ACUITY_SCORE: 35
ADLS_ACUITY_SCORE: 24

## 2024-02-25 NOTE — DISCHARGE SUMMARY
Hospitalist Discharge Summary  [facility]    Neel Fuentes MRN# [id number]   YOB: 1965 Age: 59 year old     Date of Admission:  2/24/2024  Date of Discharge:  2/25/2024  1:44 PM  Admitting Physician:  Rojas Watson MD  Discharge Physician:  Hebert Duarte DO  Discharging Service:  Hospitalist     Primary Provider: Soren Eubanks          Discharge Diagnosis:     Acute Intractable Nausea and Vomiting  Cholelithiasis s/p Lap Joanne on 2/24  Hepatic Steatosis  Hx of Elevated LFTs  - Appreciate General Surgery recommendations  - Antiemetics prn  - IVF  - NPO for now.  Could advance to clear liquid later today  - Daily BMP     Lactic Acidosis  - Likely secondary to surgery and vomiting  - Improved     Hx of Alcohol Abuse  - Last drink was 2 weeks ago.  No hx of withdrawal     Chronic Medical Problems:  Hx of Gastric Bypass  Hx of CVA  Hypertension  Hyperlipidemia  Chronic Anemia  Type 2 Diabetes Mellitus  Depression/Anxiety  Insomnia  Hx of Nicotine Dependence with Cigarettes             Discharge Disposition:     Discharged to home           Hospital Course:     Neel Fuentes is a 59 year old male with a history of gastric bypass, history of CVA, hypertension, hyperlipidemia, chronic anemia, type 2 diabetes mellitus, depression, anxiety, insomnia, history of nicotine dependence with cigarettes, history of alcohol abuse admitted on 2/24/2024 with abdominal pain with nausea and vomiting.  In the emergency department, the patient was found to have a blood pressure 128/87, heart rate 89, temperature 97.5  F, respiratory rate 14, SpO2 100% on room air.  Initial lab work showed bicarb 17, anion gap 22, glucose 152, calcium 10.2, total protein 8.6, total bilirubin 1.4, alkaline phosphatase 216, AST//93.  Right upper quadrant ultrasound showed tiny gallstones and gallbladder sludge within the distended gallbladder without evidence of cholecystitis, diffuse hepatic steatosis.  Chest  "x-ray was unremarkable.  CT abdomen and pelvis showed small volume sludge in the gallbladder and gallstones and a moderately distended gallbladder, hepatic steatosis, history of gastric bypass.  On 2/24, the patient was taken by general surgery for laparoscopic cholecystectomy.  Postoperatively, the patient developed nausea and vomiting and was monitored overnight in the hospital.  The patient's symptoms improved.  On 2/25, the patient was discharged home.    The patient was seen, examined, and counseled on this day. The hospitalization and plan of care was reviewed with the patient extensively. All questions were addressed and the patient agreed to follow-up as noted above.           Allergies:     No Known Allergies           Discharge Medications:     Discharge Medication List as of 2/25/2024  1:08 PM        START taking these medications    Details   oxyCODONE (ROXICODONE) 5 MG tablet Take 1 tablet (5 mg) by mouth every 4 hours as needed for severe pain (IF pain not managed with non-pharmacological and non-opioid interventions), Disp-10 tablet, R-0, E-Prescribe           CONTINUE these medications which have NOT CHANGED    Details   amLODIPine (NORVASC) 10 MG tablet Take 10 mg by mouth daily, Historical      buPROPion (WELLBUTRIN XL) 150 MG 24 hr tablet Take 150 mg by mouth every morning, Historical      gabapentin (NEURONTIN) 300 MG capsule Take 300 mg by mouth 3 times daily, Historical      sertraline (ZOLOFT) 100 MG tablet Take 1.5 tablets by mouth daily, Historical      zolpidem (AMBIEN) 10 MG tablet Take 10 mg by mouth nightly as needed for sleep, Historical                    Condition on Discharge:     Discharge condition: Fair   Discharge vitals: Blood pressure 129/75, pulse 79, temperature 97.7  F (36.5  C), temperature source Oral, resp. rate 14, height 1.727 m (5' 8\"), weight 68.3 kg (150 lb 8 oz), SpO2 96%.   Code status on discharge: Full Code      BASIC PHYSICAL EXAMINATION:  GENERAL: No apparent " distress.  CARDIOVASCULAR: Regular rate and rhythm without murmurs.  PULMONARY: Clear to auscultation bilaterally.   GASTROINTESTINAL: Abdomen soft, non-tender.  EXTREMITIES: No edema, pulses intact.  NEUROLOGIC: No focal deficits.            History of Illness:   See detailed admission note for full details.               Procedures excluding imaging which is summarized below:     Please see details in the electronic medical record.           Consultations:     SURGERY GENERAL IP CONSULT          Significant Results:     Results for orders placed or performed during the hospital encounter of 02/24/24   US Abdomen Limited    Narrative    EXAM: US ABDOMEN LIMITED  LOCATION: [facility]  DATE: 2/24/2024    INDICATION: Right upper quadrant abdominal pain.  COMPARISON: None.  TECHNIQUE: Limited abdominal ultrasound.    FINDINGS:    GALLBLADDER: Distended gallbladder with layering tiny gallstones and gallbladder sludge within the gallbladder lumen. No wall thickening nor pericholecystic fluid.    BILE DUCTS: No biliary dilatation. The common duct measures 3 mm.    LIVER: Increased echogenicity from diffuse fatty infiltration. No focal mass.    RIGHT KIDNEY: 11.3 cm. No hydronephrosis.    PANCREAS: The visualized portions are normal.    No ascites.      Impression    IMPRESSION:  1.  Tiny gallstones and gallbladder sludge within distended gallbladder. No cholecystitis nor bile duct dilatation.  2.  Diffuse hepatic steatosis.       Chest XR,  PA & LAT    Narrative    EXAM: XR CHEST 2 VIEWS  LOCATION: [facility]  DATE: 2/24/2024    INDICATION: Chest pain.  COMPARISON: None.      Impression    IMPRESSION: Negative chest.   CT Abdomen Pelvis w Contrast    Narrative    EXAM: CT ABDOMEN PELVIS W CONTRAST  LOCATION: [facility]  DATE: 2/24/2024    INDICATION: Abdominal pain  COMPARISON: Same-day abdominal ultrasound.  TECHNIQUE: CT scan of the abdomen and pelvis  was performed following injection of IV contrast. Multiplanar reformats were obtained. Dose reduction techniques were used.  CONTRAST: 88mL Isovue 370    FINDINGS:   LOWER CHEST: Coronary atherosclerosis.    HEPATOBILIARY: Hepatic steatosis. Small volume biliary sludge and gallstones seen in the moderately distended gallbladder. No biliary duct dilation.    PANCREAS: Normal.    SPLEEN: Normal.    ADRENAL GLANDS: Normal.    KIDNEYS/BLADDER: No significant mass, stone, or hydronephrosis. The bladder is incompletely distended accentuating wall thickening.    BOWEL: Gastric bypass. No obstruction or inflammation. Normal appendix.    LYMPH NODES: Normal.    VASCULATURE: Unremarkable.    PELVIC ORGANS: Normal.    MUSCULOSKELETAL: Degenerative changes of the spine and bilateral hips.      Impression    IMPRESSION:   1.  Similar small volume biliary sludge and gallstones in the moderately distended gallbladder.  2.  Hepatic steatosis.  3.  Gastric bypass.       Transthoracic Echocardiogram Results:  No results found for this or any previous visit (from the past 4320 hour(s)).             Pending Results:     Unresulted Labs Ordered in the Past 30 Days of this Admission       Date and Time Order Name Status Description    2/24/2024  6:20 PM Surgical Pathology Exam In process                         Discharge Instructions and Follow-Up:     Discharge instructions and follow-up:   Discharge Procedure Orders   Reason for your hospital stay   Order Comments: Symptomatic Cholelithiasis, Elevated Liver Enzymes     Follow-up and recommended labs and tests    Order Comments: Follow up with primary care provider, Soren Eubanks, within 7 days for hospital follow- up.  The following labs/tests are recommended: CMP, INR    You may benefit from a referral to a gastroenterologist if your liver enzymes continue to be elevated.  Your primary care doctor could arrange this.    Recommend you avoid alcohol. You liver showed signs of fatty  changes which can be seen with long term use of alcohol.  This can lead to end stage liver disease/cirrhosis or even liver failure with continued alcohol use.     Activity   Order Comments: Your activity upon discharge: activity as tolerated     Order Specific Question Answer Comments   Is discharge order? Yes      Diet   Order Comments: Follow this diet upon discharge: Orders Placed This Encounter      Low Fat Diet (up to 50g)     Order Specific Question Answer Comments   Is discharge order? Yes           Total time spent in face to face contact with the patient and coordinating discharge was:  34 Minutes    Hebert Duarte DO  [facility] Hospitalist  [address] [place] [address]  02/25/2024

## 2024-02-25 NOTE — ANESTHESIA CARE TRANSFER NOTE
Patient: Neel Fuentes    Procedure: Procedure(s):  CHOLECYSTECTOMY, ROBOT-ASSISTED       Diagnosis: Cholecystitis [K81.9]  Diagnosis Additional Information: No value filed.    Anesthesia Type:   General     Note:    Oropharynx: oropharynx clear of all foreign objects  Level of Consciousness: awake and drowsy  Oxygen Supplementation: face mask    Independent Airway: airway patency satisfactory and stable  Dentition: dentition unchanged  Vital Signs Stable: post-procedure vital signs reviewed and stable  Report to RN Given: handoff report given  Patient transferred to: PACU    Handoff Report: Identifed the Patient, Identified the Reponsible Provider, Reviewed the pertinent medical history, Discussed the surgical course, Reviewed Intra-OP anesthesia mangement and issues during anesthesia, Set expectations for post-procedure period and Allowed opportunity for questions and acknowledgement of understanding      Vitals:  Vitals Value Taken Time   /90 02/24/24 1925   Temp 97.4  F (36.3  C) 02/24/24 1925   Pulse 83 02/24/24 1929   Resp 9 02/24/24 1929   SpO2 96 % 02/24/24 1929   Vitals shown include unfiled device data.    Electronically Signed By: Leonid Temple MD  February 24, 2024  7:30 PM

## 2024-02-25 NOTE — DISCHARGE INSTRUCTIONS
HOME CARE FOLLOWING LAPAROSCOPIC CHOLECYSTECTOMY  SEGUNDO Cheatham, MARGIE Hightower C. Pratt, J. Shaheen    INCISIONAL CARE:  Replace the bandage over your incisions DAILY until all drainage stops, or if more comfortable to have in place.  If present, leave the steri-strips (white paper tapes) in place for 14 days after surgery.  If Dermabond (a type of skin glue) is present, leave in place until it wears/flakes off (2-3 weeks).     BATHING:  OK to shower 48 hours after surgery.  Avoid baths for 1 week after surgery.  You may wash your hair at any time.  Gently pat your incision dry after bathing.  Do not apply lotions, creams, or ointments to incisions.    ACTIVITY:  Light Activity -- you may immediately be up and about as tolerated.  Walking is encouraged, increase as tolerated.  Driving/Light Work-- when comfortable and off narcotic pain medications.  Strenuous Work/Activity -- limit lifting to 20 pounds for 2 weeks.  Progressively increase with time.  Active Sports (running, biking, etc.) -- cautiously resume after 2 weeks.    DISCOMFORT:  Local anesthetic placed at surgery should provide relief for 4-8 hours.  Begin taking pain pills before discomfort is severe.  Take the pain medication with some food, when possible, to minimize side effects.  Intermittent use of ice packs may help during the first 1-3 weeks after surgery.  Expect gradual improvement.    Recommend the following over the counter medications:  - Ibuprofen (motrin) 200mg every 6 hours (max 2,400mg per day)   - Tylenol (acetaminophen) 500mg every 6 hours (max 4,000mg per day)  - Take with food if GI upset occurs  - If additional pain medication is needed, take the narcotic that you were prescribed.    Over-the-counter anti-inflammatory medications (i.e. Ibuprofen/Advil/Motrin or Naprosyn/Aleve) may be used per package instructions in addition to or while tapering off the narcotic pain medications to decrease swelling and  sensitivity.  DO NOT TAKE these Anti-inflammatory medications if your primary physician has advised against doing so, or if you have acid reflux, ulcer, or bleeding disorder, or take blood-thinner medications.  Call your primary physician or the surgery office if you have medication questions.    After laparoscopic cholecystectomy, you may have shoulder or upper back discomfort due to the gas used during surgery.  This is temporary and should resolve within 2-3 days.  Frequent short walks may help with this.  You may have decreased energy level for 1-2 weeks after surgery related to your recovery.    DIET:  Start with liquids and gradually increase diet as tolerated.  Drink plenty of fluids.  While taking pain medications, consider use of a stool softener, increase your fiber in your diet, or add a fiber supplement (like Metamucil, Citrucel) to help prevent constipation - a possible side effect of pain medications.  It is not uncommon to experience some bowel changes (loose stools or constipation) after surgery.  Your body has to adapt to you no longer having a gall bladder.  To help minimize this side effect, avoid fatty foods for 1-2 weeks after surgery.  You may then slowly increase the amount of fatty foods in your diet.      NAUSEA:  If nauseated from the anesthetic/pain meds; rest in bed, get up cautiously with assistance, and drink clear liquids (juice, tea, broth).    FOLLOW-UP AFTER SURGERY:  -Our office will contact you approximately 2-3 weeks after surgery to check on your progress and answer any questions you may have.  If you are doing well, you will not need to return for an office appointment.  If any concerns are identified over the phone, we will help you make an appointment to see a provider.    -If you have not received a phone call, have any questions or concerns, or would like to be seen, please call us at [phone].  We are located at: [address] [facility], [address]; [place]  [address]    -CONTACT US IF THE FOLLOWING DEVELOPS:   1. A fever that is above 101     2. Increased redness, warmth, drainage, bleeding, or swelling.   3. Pain that is not relieved by rest/ice and your prescription.   4.  Increasing pain after 48 hours.   5. Drainage that is thick, cloudy, yellow, green or white.   6. Any other questions or concerns.      FREQUENTLY ASKED QUESTIONS:    Q:  How should my incision look?    A:  Normally your incision will appear slightly swollen with light redness directly along the incision itself as it heals.  It may feel like a bump or ridge as the healing/scarring happens, and over time (3-4 months) this bump or ridge feeling should slowly go away.  In general, clear or pink watery drainage can be normal at first as your incision heals, but should decrease over time.    Q:  How do I know if my incision is infected?  A:  Look at your incision for signs of infection, like redness around the incision spreading to surrounding skin, or drainage of cloudy or foul-smelling drainage.  If you feel warm, check your temperature to see if you are running a fever.    **If any of these things occur, please notify the nurse at our office.  We may need you to come into the office for an incision check.      Q:  How do I take care of my incision?  A:  If you have a dressing in place - Starting the day after surgery, replace the dressing 1-2 times a day until there is no further drainage from the incision.  At that time, a dressing is no longer needed.  Try to minimize tape on the skin if irritation is occurring at the tape sites.  If you have significant irritation from tape on the skin, please call the office to discuss other method of dressing your incision.    Small pieces of tape called  steri-strips  may be present directly overlying your incision; these may be removed 10 days after surgery unless otherwise specified by your surgeon.  If these tapes start to loosen at the ends, you may trim them  back until they fall off or are removed.    A:  If you had  Dermabond  tissue glue used as a dressing (this causes your incision to look shiny with a clear covering over it) - This type of dressing wears off with time and does not require more dressings over the top unless it is draining around the glue as it wears off.  Do not apply ointments or lotions over the incisions until the glue has completely worn off.    Q:  There is a piece of tape or a sticky  lead  still on my skin.  Can I remove this?  A:  Sometimes the sticky  leads  used for monitoring during surgery or for evaluation in the emergency department are not all removed while you are in the hospital.  These sometimes have a tab or metal dot on them.  You can easily remove these on your own, like taking off a band-aid.  If there is a gel substance under the  lead , simply wipe/clean it off with a washcloth or paper towel.      Q:  What can I do to minimize constipation (very hard stools, or lack of stools)?  A:  Stay well hydrated.  Increase your dietary fiber intake or take a fiber supplement -with plenty of water.  Walk around frequently.  You may consider an over-the-counter stool-softener.  Your Pharmacist can assist you with choosing one that is stocked at your pharmacy.  Constipation is also one of the most common side effects of pain medication.  If you are using pain medication, be pro-active and try to PREVENT problems with constipation by taking the steps above BEFORE constipation becomes a problem.    Q:  What do I do if I need more pain medications?  A:  Call the office to receive refills.  Be aware that certain pain meds cannot be called into a pharmacy and actually require a paper prescription.  A change may be made in your pain med as you progress thru your recovery period or if you have side effects to certain meds.    --Pain meds are NOT refilled after 5pm on weekdays, and NOT AT ALL on the weekends, so please look ahead to prevent  problems.      Q:  Why am I having a hard time sleeping now that I am at home?  A:  Many medications you receive while you are in the hospital can impact your sleep for a number of days after your surgery/hospitalization.  Decreased level of activity and naps during the day may also make sleeping at night difficult.  Try to minimize day-time naps, and get up frequently during the day to walk around your home during your recovery time.  Sleep aides may be of some help, but are not recommended for long-term use.      Q:  I am having some back discomfort.  What should I do?  A:  This may be related to certain positioning that was required for your surgery, extended periods of time in bed, or other changes in your overall activity level.  You may try ice, heat, acetaminophen, or ibuprofen to treat this temporarily.  Note that many pain medications have acetaminophen in them and would state this on the prescription bottle.  Be sure not to exceed the maximum of 4000mg per day of acetaminophen.     **If the pain you are having does not resolve, is severe, or is a flare of back pain you have had on other occasions prior to surgery, please contact your primary physician for further recommendations or for an appointment to be examined at their office.    Q:  Why am I having headaches?  A:  Headaches can be caused by many things:  caffeine withdrawal, use of pain meds, dehydration, high blood pressure, lack of sleep, over-activity/exhaustion, flare-up of usual migraine headaches.  If you feel this is related to muscle tension (a band-like feeling around the head, or a pressure at the low-back of the head) you may try ice or heat to this area.  You may need to drink more fluids (try electrolyte drink like Gatorade), rest, or take your usual migraine medications.   **If your headaches do not resolve, worsen, are accompanied by other symptoms, or if your blood pressure is high, please call your primary physician for  recommendation and/or examination.    Q:  I am unable to urinate.  What do I do?  A:  A small percentage of people can have difficulty urinating initially after surgery.  This includes being able to urinate only a very small amount at a time and feeling discomfort or pressure in the very low abdomen.  This is called  urinary retention , and is actually an urgent situation.  Proceed to your nearest Emergency department for evaluation (not an Urgent Care Center).  Sometimes the bladder does not work correctly after certain medications you receive during surgery, or related to certain procedures.  You may need to have a catheter placed until your bladder recovers.  When planning to go to an Emergency department, it may help to call the ER to let them know you are coming in for this problem after a surgery.  This may help you get in quicker to be evaluated.  **If you have symptoms of a urinary tract infection, please contact your primary physician for the proper evaluation and treatment.          If you have other questions, please call the office Monday thru Friday between 8am and 4:30pm to discuss with the nurse or physician assistant.  #[phone]    There is a surgeon ON CALL on weekday evenings and over the weekend in case of urgent need only, and may be contacted at the same number.    If you are having an emergency, call 911 or proceed to your nearest emergency department.

## 2024-02-25 NOTE — PROGRESS NOTES
"Surgery Progress    S: feels much better. RUQ pain is gone. Abdomen feels sore and tight like he did a bunch of sit ups. Reportedly dry heaving overnight but states feels better this morning.    O /75 (BP Location: Right arm)   Pulse 79   Temp 97.7  F (36.5  C) (Oral)   Resp 14   Ht 1.727 m (5' 8\")   Wt 68.3 kg (150 lb 8 oz)   SpO2 96%   BMI 22.88 kg/m    Looks well  Abdomen flat, no RUQ tenderness, incisions c/d/I      Labs reviewed   Bili 1.2  Alt 148  Alst 335      A/P  POD#1 robotic cholecystectomy. Some dry heaving last night but ruq pain improved. ALT/AST mildly up but to be expected, bilirubin downtrended.   OK For discharge  Smaller doses of ibuprofen (due to gastric bypass status) and tylenol ( transaminitis likely secondary to etoh).  Oxycodone prescribed  Work letter for 2 weeks off written  Discussed with Dr Gerald García MD    "

## 2024-02-25 NOTE — H&P
[facility]  Internal Medicine  History and Physical      Patient Name: Neel Fuentes MRN# [id number]   Age: 59 year old YOB: 1965     Date of Admission:2/24/2024    Primary care provider: Soren Eubanks  Date of Service: 2/24/2024         Assessment and Plan:   Neel Fuentes is a 59 year old male with a history of CVA, Depression, Alcohol Abuse, Marijuana Use, Tobacco Abuse, DM type 2, HTN, DUSTIN, Insomnia, Chronic Anemia, Gastric Bypass who presented to the ED today with abdominal pain and vomiting for the last 2 to 3 days follow-up with chest pain that started last night.  He has abdominal pain is localized to the right upper quadrant and is constant.         Cholelithiasis  S/p Laparoscopic Cholecystectomy  Hepatic Steatosis  Hx of elevated LFTs  Pt presented with several days of epigastric and RUQ abdominal pain with nausea and vomiting and one day of intermittent chest pain.  - Troponin on admission negative x2.  EKG with no acute ischemic changes.  CXR negative.     - Hx of elevated AST/ALT in the past.  Most recent LFTs at PCP appt 2/12/24 with tbili 2.5, alk phos 191, ,  and recommended to have a RUQ U/S at that time.  - ongoing elevated LFTs today, suspected to be due to hx of alcohol abuse, underlying hepatic steatosis and gallbladder disease  - studies including ultrasound and CT were consistent with a distended gallbladder and cholelithiasis.   - General Surgery was consulted and patient underwent a laparoscopic cholecystectomy which revealed a very distended gallbladder without inflammation  - discussed with General Surgery who requested, given elevated lactic acid on admission, recommend empiric antibiotics overnight.  Will give a dose of IV Ceftriaxone now  - repeat lactic acid now and LFTs in am     - start a PPI    Hx Alcohol Abuse  Hx of daily etoh use.  Last drink was two weeks ago.  No hx of withdrawals.  - monitor    HTN  HLD  Hx of HTN and HLD on meds in the  past, not currently.  Most recent lipid panel wnl 2/12/24.  Troponin on admission negative x2.  EKG with no acute ischemic changes.  CXR negative.  CT notes coronary atherosclerosis on lower chest images.    - follow up management with PCP    Hx CVA  Pt reports a hx of an incidentally noted CVA in the past.  Most recent Brain MRI 2022 reported multiple probable chronic small vessel ischemic foci are present within the deep and subcortical white matter of the frontal and parietal lobes bilaterally.   - follow up management with PCP    Hx Tobacco Abuse  Former smoker; quit 2022. Smoked tobacco from late teens to mid 50's but smoked one pack every 3-4 days       Chronic Anemia  Hx Gastric Bypass  Baseline hgb 10-12.  No reports of GI blood loss.  May be related to hx of gastric bypass.  - follow up management with PCP     DM Type 2   Most recent Hgb A1C 5.9 (2/12/24).  BS on admission 152.  Diet controlled.  - monitor    Depression/DUSTIN/Insomnia  - continue Sertraline, Gabapentin and Wellbutrin.  Hold Ambien      CODE: full  Diet/IVF: clear liquids, NS  GI ppx:  protonix  DVT ppx: scd    Ava Sheehan MS PA-C  Physician Assistant   Hospitalist Service           Chief Complaint:   Abdominal pain, vomiting, chest pain          HPI:   59 year old male with a history of CVA, Depression, Alcohol Abuse, Marijuana Use, Tobacco Abuse, DM type 2, HTN, DUSTIN, Insomnia, Chronic Anemia, Gastric Bypass who presented to the ED today with abdominal pain and vomiting for the last 2 to 3 days follow-up with chest pain that started last night.  He has abdominal pain is localized to the right upper quadrant and is constant.       Signout received from the ED physician and discussed with General Surgery and patient was taken to the OR from the ED.    Patient seen in the PACU. He denies any chest pain or shortness of breath.  Abdominal pain has improved.  Abdomen just feels sore now.  Denies any nausea or emesis.  Reports last drink of  alcohol was two weeks ago.         Past Medical History:     Past Medical History:   Diagnosis Date    Diabetes (H)     hgb A1C below 6.9 since Gastric Bypass 1996   hypertension, HLD, DM2, ED, ETOH abuse, depression, insomnia, DUSTIN        Past Surgical History:   C-spine fusion, gastric bypass, carpal tunnel release, L-ankle repair, shoulder surgery x3        Social History:   H/O ETOH abuse and still drinks, smokes marijuana and quit tobacco in 2022   Social History     Socioeconomic History    Marital status:      Spouse name: Not on file    Number of children: Not on file    Years of education: Not on file    Highest education level: Not on file   Occupational History    Not on file   Tobacco Use    Smoking status: Some Days    Smokeless tobacco: Not on file    Tobacco comments:     Cigars   Substance and Sexual Activity    Alcohol use: Yes    Drug use: Yes     Types: Marijuana    Sexual activity: Not on file   Other Topics Concern    Not on file   Social History Narrative    Not on file     Social Determinants of Health     Financial Resource Strain: Not on file   Food Insecurity: Not on file   Transportation Needs: Not on file   Physical Activity: Not on file   Stress: Not on file   Social Connections: Not on file   Interpersonal Safety: Not on file   Housing Stability: Not on file          Family History:     Family History   Problem Relation Age of Onset    Substance Abuse Father     Substance Abuse Brother    family history: ETOH abuse, DM2, peripheral vascular disorder        Allergies:    No Known Allergies       Medications:     Prior to Admission medications    Medication Sig Last Dose Taking? Auth Provider Long Term End Date   amLODIPine (NORVASC) 10 MG tablet Take 10 mg by mouth daily Couple weeks ago Yes Unknown, Entered By History No    buPROPion (WELLBUTRIN XL) 150 MG 24 hr tablet Take 150 mg by mouth every morning 2/23/2024 Yes Unknown, Entered By History Yes    gabapentin (NEURONTIN) 300 MG  capsule Take 300 mg by mouth 3 times daily 2/23/2024 at pm Yes Unknown, Entered By History No    sertraline (ZOLOFT) 100 MG tablet Take 1.5 tablets by mouth daily 2/22/2024 Yes Reported, Patient Yes    zolpidem (AMBIEN) 10 MG tablet Take 10 mg by mouth nightly as needed for sleep 2/23/2024 at hs Yes Unknown, Entered By History            Review of Systems:   A complete ROS was performed and is negative other than what is stated in the HPI.       Physical Exam:   Blood pressure 128/87, pulse 89, temperature 97.5  F (36.4  C), temperature source Temporal, resp. rate 14, weight 67.6 kg (149 lb), SpO2 100%.  General: Alert, interactive, NAD  HEENT: AT/NC  Chest/Resp: clear to auscultation bilaterally, no crackles or wheezes  Heart/CV: regular rate and rhythm, no murmur  Abdomen/GI: Soft, nontender, nondistended. Decreased BS.  No rebound or guarding.  Incisions c/d/i  Extremities/MSK: No LE edema  Skin: Warm and dry  Neuro: Alert & oriented x 3, no focal deficits, moves all extremities equally         Labs:   ROUTINE ICU LABS (Last four results)  CMP  Recent Labs   Lab 02/24/24  0957      POTASSIUM 4.2   CHLORIDE 100   CO2 17*   ANIONGAP 22*   *   BUN 14.7   CR 0.83   GFRESTIMATED >90   MAYI 10.2*   PROTTOTAL 8.6*   ALBUMIN 4.8   BILITOTAL 1.4*   ALKPHOS 216*   *   ALT 93*     CBC  Recent Labs   Lab 02/24/24  0957   WBC 7.4   RBC 4.47   HGB 13.5   HCT 40.2   MCV 90   MCH 30.2   MCHC 33.6   RDW 16.8*        INRNo lab results found in last 7 days.  Arterial Blood GasNo lab results found in last 7 days.       Imaging/Procedures:     Results for orders placed or performed during the hospital encounter of 02/24/24   US Abdomen Limited    Narrative    EXAM: US ABDOMEN LIMITED  LOCATION: [facility]  DATE: 2/24/2024    INDICATION: Right upper quadrant abdominal pain.  COMPARISON: None.  TECHNIQUE: Limited abdominal ultrasound.    FINDINGS:    GALLBLADDER: Distended gallbladder  with layering tiny gallstones and gallbladder sludge within the gallbladder lumen. No wall thickening nor pericholecystic fluid.    BILE DUCTS: No biliary dilatation. The common duct measures 3 mm.    LIVER: Increased echogenicity from diffuse fatty infiltration. No focal mass.    RIGHT KIDNEY: 11.3 cm. No hydronephrosis.    PANCREAS: The visualized portions are normal.    No ascites.      Impression    IMPRESSION:  1.  Tiny gallstones and gallbladder sludge within distended gallbladder. No cholecystitis nor bile duct dilatation.  2.  Diffuse hepatic steatosis.       Chest XR,  PA & LAT    Narrative    EXAM: XR CHEST 2 VIEWS  LOCATION: [facility]  DATE: 2/24/2024    INDICATION: Chest pain.  COMPARISON: None.      Impression    IMPRESSION: Negative chest.   CT Abdomen Pelvis w Contrast    Narrative    EXAM: CT ABDOMEN PELVIS W CONTRAST  LOCATION: [facility]  DATE: 2/24/2024    INDICATION: Abdominal pain  COMPARISON: Same-day abdominal ultrasound.  TECHNIQUE: CT scan of the abdomen and pelvis was performed following injection of IV contrast. Multiplanar reformats were obtained. Dose reduction techniques were used.  CONTRAST: 88mL Isovue 370    FINDINGS:   LOWER CHEST: Coronary atherosclerosis.    HEPATOBILIARY: Hepatic steatosis. Small volume biliary sludge and gallstones seen in the moderately distended gallbladder. No biliary duct dilation.    PANCREAS: Normal.    SPLEEN: Normal.    ADRENAL GLANDS: Normal.    KIDNEYS/BLADDER: No significant mass, stone, or hydronephrosis. The bladder is incompletely distended accentuating wall thickening.    BOWEL: Gastric bypass. No obstruction or inflammation. Normal appendix.    LYMPH NODES: Normal.    VASCULATURE: Unremarkable.    PELVIC ORGANS: Normal.    MUSCULOSKELETAL: Degenerative changes of the spine and bilateral hips.      Impression    IMPRESSION:   1.  Similar small volume biliary sludge and gallstones in the moderately  distended gallbladder.  2.  Hepatic steatosis.  3.  Gastric bypass.

## 2024-02-25 NOTE — PLAN OF CARE
Patient's After Visit Summary was reviewed with patient.  Patient verbalized understanding of After Visit Summary, recommended follow up and was given an opportunity to ask questions.   Discharge medications sent home with patient/family: YES   Discharged with other:Markie

## 2024-02-25 NOTE — OP NOTE
[facility] General Surgery Operative Note    Pre-operative diagnosis: acute cholecystitis   Post-operative diagnosis: Gallstones, no cholecystitis   Procedure: Robotic cholecystectomy    Surgeon: Marimar García MD   Assistant(s): Juan Tristan PA-C  The Physician Assistant was medically necessary for their expertise in prepping, robotic instrument exchange, passing of material through port sites, closure of port sites, suturing and retraction.   Anesthesia: general   Estimated blood loss:  Specimen: 5 cc  gallbladder and contents               INDICATION FOR OPERATION: This is a 59 year old male who presented to the ER with 3 days of abdominal pain, nausea and vomiting. Studies including ultrasound and CT were consistent with a distended gallbladder. He had significant right upper quadrant tenderness on exam. LFTs were mildly elevated but actually decreased from routine lab check 2 weeks ago. History of alcohol use, and has been sober 2 weeks. Suspect this is the etiology. Normal CBD size, soi we did not suspect choledocholithiasis. We discussed robotic assisted laparoscopic cholecystectomy and the patient agreed to proceed after hearing the risks and benefits.    DESCRIPTION OF PROCEDURE:  The patient was taken to the operating room and placed on the table in supine position.  General endotracheal anesthesia was induced and the abdomen was prepped and draped in standard sterile fashion.    An infraumbilical incision was created and we dissected bluntly to the fascia which was grasped and elevated with kocher clamps then incised and the abdomen was entered. An 8mm port was placed.  The abdomen was insufflated with CO2.  Additional 8mm robotic ports were placed, two in the left abdomen and one in the right abdomen. The patient was placed in reverse Trendelenburg and right side up.  The robot was then docked.    There was hepatomegaly. The gallbladder was quite distended but there was no wall inflammation  nor edema.  The fundus of the gallbladder was grasped and retracted cephalad with a prograsp. Significant amount of filmy omental adhesions were taken down with cautery.  The infundibulum was grasped and retracted laterally.  The peritoneum over the medial and lateral aspects of the triangle of Calot was taken down with blunt dissection and cautery.  The cystic duct and artery were freed up from surrounding tissues.  The triangle of Calot was skeletonized revealing the critical view of safety.  Intraoperative fluorescence was used to evaluate the biliary anatomy with no additional biliary structures lighting up other than the cystic duct and common bile duct.    The duct was clipped twice proximally, once distally, and the cystic artery cauterized with bipolar distally then clipped once proximally, then both were transected.  The gallbladder was then removed from the liver using the cautery.  Before the gallbladder was completely removed from the cystic plate fluorescence was again used to evaluate for any additional biliary structures and none seen . The gallbladder was passed into an Endocatch bag at the umbilical port site. We observed the right upper quadrant carefully for hemostasis.  Hemostasis was assured.  The right upper quadrant was irrigated    The endocatch bag was removed from the abdomen. 0 vicryl suture was placed in the infraumbilical fascia. Marcaine was injected along the fascia and skin incisions.  All of the incisions were closed with interrupted 4-0 Vicryl subcuticular sutures and Steri-Strips.  The patient tolerated the procedure well.  Sponge and instrument counts were correct.      FINDINGS: very distended gallbladder but filled with bile and without inflammation or edema.    Marimar García MD

## 2024-02-25 NOTE — PROGRESS NOTES
[facility]    Hospitalist Progress Note  Name: Neel Fuentes    MRN: [id number]  Provider:  Hebert Duarte DO  Date of Service: 02/25/2024    Summary of Stay: Neel Fuentes is a 59 year old male with a history of gastric bypass, history of CVA, hypertension, hyperlipidemia, chronic anemia, type 2 diabetes mellitus, depression, anxiety, insomnia, history of nicotine dependence with cigarettes, history of alcohol abuse admitted on 2/24/2024 with abdominal pain with nausea and vomiting.  In the emergency department, the patient was found to have a blood pressure 128/87, heart rate 89, temperature 97.5  F, respiratory rate 14, SpO2 100% on room air.  Initial lab work showed bicarb 17, anion gap 22, glucose 152, calcium 10.2, total protein 8.6, total bilirubin 1.4, alkaline phosphatase 216, AST//93.  Right upper quadrant ultrasound showed tiny gallstones and gallbladder sludge within the distended gallbladder without evidence of cholecystitis, diffuse hepatic steatosis.  Chest x-ray was unremarkable.  CT abdomen and pelvis showed small volume sludge in the gallbladder and gallstones and a moderately distended gallbladder, hepatic steatosis, history of gastric bypass.  On 2/24, the patient was taken by general surgery for laparoscopic cholecystectomy.  Postoperatively, the patient developed nausea and vomiting and was monitored overnight in the hospital.    TODAY'S PLAN:  Appreciate General Surgery recommendations.  LFTs up a bit this AM, though alk phos and bili have improved.  Pt dry heaving overnight with most recent around 6 am.  Maintain NPO (pt had a sip of apple juice this morning).  Discussed with General Surgery.  Start IVF for now.  Looks like his LFTs have been elevated at his PCP visits so possible this is chronic, though unclear relation to his symptoms.  Check EKG for completeness.  Possible discharge later today vs tomorrow pending improvement in symptoms.      Problem List:   Acute  Intractable Nausea and Vomiting  Cholelithiasis s/p Lap Joanne on 2/24  Hepatic Steatosis  Hx of Elevated LFTs  - Appreciate General Surgery recommendations  - Antiemetics prn  - IVF  - NPO for now.  Could advance to clear liquid later today  - Daily BMP    Lactic Acidosis  - Likely secondary to surgery and vomiting  - Improved    Hx of Alcohol Abuse  - Last drink was 2 weeks ago.  No hx of withdrawal    Chronic Medical Problems:  Hx of Gastric Bypass  Hx of CVA  Hypertension  Hyperlipidemia  Chronic Anemia  Type 2 Diabetes Mellitus  Depression/Anxiety  Insomnia  Hx of Nicotine Dependence with Cigarettes    I spent 53 minutes in reviewing this patient's labs, imaging, medications, medical history.  In addition time was spent interviewing the patient, communicating with family, and medical decision making.     DVT Prophylaxis: Pneumatic Compression Devices  Code Status: Full Code  Diet: NPO per Anesthesia Guidelines for Procedure/Surgery Except for: Meds    Jacobs Catheter: Not present  Disposition: Expected discharge today vs tomorrow to home. Goals prior to discharge include symptoms improved, tolerating oral diet.   Family updated today: No     Interval History   Pt seen and examined.  Pt reports dry heaving overnight.  Reports lower abd pain from dry heaving.  Denies any RUQ abd pain.    -Data reviewed today: I personally reviewed all new labs and imaging results over the last 24 hours.     Physical Exam   Temp: 97.7  F (36.5  C) Temp src: Oral BP: 129/75 Pulse: 79   Resp: 14 SpO2: 96 % O2 Device: None (Room air) Oxygen Delivery: 6 LPM  Vitals:    02/24/24 1612 02/24/24 2053   Weight: 67.6 kg (149 lb) 68.3 kg (150 lb 8 oz)     Vital Signs with Ranges  Temp:  [96.8  F (36  C)-98.4  F (36.9  C)] 97.7  F (36.5  C)  Pulse:  [] 79  Resp:  [9-20] 14  BP: (117-174)/() 129/75  SpO2:  [92 %-100 %] 96 %  I/O last 3 completed shifts:  In: 1750 [P.O.:50; I.V.:1700]  Out: 350 [Urine:350]    GENERAL: No apparent  "distress. Awake, alert, and fully oriented.  HEENT: Normocephalic, atraumatic. Extraocular movements intact.  CARDIOVASCULAR: Regular rate and rhythm without murmurs or rubs. No S3.  PULMONARY: Clear bilaterally.  GASTROINTESTINAL: Soft, non-tender, non-distended. Bowel sounds normoactive.   EXTREMITIES: No cyanosis or clubbing. No edema.  NEUROLOGICAL: CN 2-12 grossly intact, no focal neurological deficits.  DERMATOLOGICAL: No rash, ulcer, bruising, nor jaundice.    Medications    sodium chloride        buPROPion  150 mg Oral QAM    cefTRIAXone  2 g Intravenous Q24H    gabapentin  300 mg Oral TID    pantoprazole  40 mg Intravenous Q24H    sertraline  150 mg Oral Daily    sodium chloride (PF)  3 mL Intracatheter Q8H     Data     Laboratory:  Recent Labs   Lab 02/24/24  0957   WBC 7.4   HGB 13.5   HCT 40.2   MCV 90        Recent Labs   Lab 02/25/24  0753 02/25/24  0559 02/24/24  2038 02/24/24  0957     --   --  139   POTASSIUM 3.9  --   --  4.2   CHLORIDE 101  --   --  100   CO2 19*  --   --  17*   ANIONGAP 15  --   --  22*   * 125* 144* 152*   BUN 10.3  --   --  14.7   CR 0.76  --   --  0.83   GFRESTIMATED >90  --   --  >90   MAYI 8.9  --   --  10.2*     No results for input(s): \"CULT\" in the last 168 hours.    Imaging:  Recent Results (from the past 24 hour(s))   US Abdomen Limited    Narrative    EXAM: US ABDOMEN LIMITED  LOCATION: [facility]  DATE: 2/24/2024    INDICATION: Right upper quadrant abdominal pain.  COMPARISON: None.  TECHNIQUE: Limited abdominal ultrasound.    FINDINGS:    GALLBLADDER: Distended gallbladder with layering tiny gallstones and gallbladder sludge within the gallbladder lumen. No wall thickening nor pericholecystic fluid.    BILE DUCTS: No biliary dilatation. The common duct measures 3 mm.    LIVER: Increased echogenicity from diffuse fatty infiltration. No focal mass.    RIGHT KIDNEY: 11.3 cm. No hydronephrosis.    PANCREAS: The visualized " portions are normal.    No ascites.      Impression    IMPRESSION:  1.  Tiny gallstones and gallbladder sludge within distended gallbladder. No cholecystitis nor bile duct dilatation.  2.  Diffuse hepatic steatosis.       Chest XR,  PA & LAT    Narrative    EXAM: XR CHEST 2 VIEWS  LOCATION: [facility]  DATE: 2/24/2024    INDICATION: Chest pain.  COMPARISON: None.      Impression    IMPRESSION: Negative chest.   CT Abdomen Pelvis w Contrast    Narrative    EXAM: CT ABDOMEN PELVIS W CONTRAST  LOCATION: [facility]  DATE: 2/24/2024    INDICATION: Abdominal pain  COMPARISON: Same-day abdominal ultrasound.  TECHNIQUE: CT scan of the abdomen and pelvis was performed following injection of IV contrast. Multiplanar reformats were obtained. Dose reduction techniques were used.  CONTRAST: 88mL Isovue 370    FINDINGS:   LOWER CHEST: Coronary atherosclerosis.    HEPATOBILIARY: Hepatic steatosis. Small volume biliary sludge and gallstones seen in the moderately distended gallbladder. No biliary duct dilation.    PANCREAS: Normal.    SPLEEN: Normal.    ADRENAL GLANDS: Normal.    KIDNEYS/BLADDER: No significant mass, stone, or hydronephrosis. The bladder is incompletely distended accentuating wall thickening.    BOWEL: Gastric bypass. No obstruction or inflammation. Normal appendix.    LYMPH NODES: Normal.    VASCULATURE: Unremarkable.    PELVIC ORGANS: Normal.    MUSCULOSKELETAL: Degenerative changes of the spine and bilateral hips.      Impression    IMPRESSION:   1.  Similar small volume biliary sludge and gallstones in the moderately distended gallbladder.  2.  Hepatic steatosis.  3.  Gastric bypass.         Hebert Duarte DO  [facility] Hospitalist  [address] [place] [address]  Securely message with Vocera (more info)  Text page via [email or web address] [employer]   02/25/2024

## 2024-02-25 NOTE — ANESTHESIA POSTPROCEDURE EVALUATION
Patient: Neel Fuentes    Procedure: Procedure(s):  CHOLECYSTECTOMY, ROBOT-ASSISTED       Anesthesia Type:  General    Note:  Disposition: Inpatient   Postop Pain Control: Uneventful            Sign Out: Well controlled pain   PONV: No   Neuro/Psych: Uneventful            Sign Out: Acceptable/Baseline neuro status   Airway/Respiratory: Uneventful            Sign Out: Acceptable/Baseline resp. status   CV/Hemodynamics: Uneventful            Sign Out: Acceptable CV status; No obvious hypovolemia; No obvious fluid overload   Other NRE: NONE   DID A NON-ROUTINE EVENT OCCUR? No           Last vitals:  Vitals Value Taken Time   BP     Temp     Pulse 93 02/24/24 1839   Resp 15 02/24/24 1839   SpO2 100 % 02/24/24 1839   Vitals shown include unfiled device data.    Electronically Signed By: Leonid Temple MD  February 24, 2024  6:40 PM

## 2024-02-26 LAB
ATRIAL RATE - MUSE: 66 BPM
DIASTOLIC BLOOD PRESSURE - MUSE: NORMAL MMHG
INTERPRETATION ECG - MUSE: NORMAL
P AXIS - MUSE: 58 DEGREES
PR INTERVAL - MUSE: 154 MS
QRS DURATION - MUSE: 78 MS
QT - MUSE: 436 MS
QTC - MUSE: 457 MS
R AXIS - MUSE: -24 DEGREES
SYSTOLIC BLOOD PRESSURE - MUSE: NORMAL MMHG
T AXIS - MUSE: 22 DEGREES
VENTRICULAR RATE- MUSE: 66 BPM

## 2024-02-27 LAB
PATH REPORT.COMMENTS IMP SPEC: NORMAL
PATH REPORT.COMMENTS IMP SPEC: NORMAL
PATH REPORT.FINAL DX SPEC: NORMAL
PATH REPORT.GROSS SPEC: NORMAL
PATH REPORT.MICROSCOPIC SPEC OTHER STN: NORMAL
PATH REPORT.RELEVANT HX SPEC: NORMAL
PHOTO IMAGE: NORMAL

## 2024-02-28 LAB
ATRIAL RATE - MUSE: 73 BPM
DIASTOLIC BLOOD PRESSURE - MUSE: NORMAL MMHG
INTERPRETATION ECG - MUSE: NORMAL
P AXIS - MUSE: 45 DEGREES
PR INTERVAL - MUSE: 160 MS
QRS DURATION - MUSE: 86 MS
QT - MUSE: 408 MS
QTC - MUSE: 449 MS
R AXIS - MUSE: -18 DEGREES
SYSTOLIC BLOOD PRESSURE - MUSE: NORMAL MMHG
T AXIS - MUSE: 19 DEGREES
VENTRICULAR RATE- MUSE: 73 BPM

## 2024-03-13 ENCOUNTER — TELEPHONE (OUTPATIENT)
Dept: SURGERY | Facility: CLINIC | Age: 59
End: 2024-03-13
Payer: COMMERCIAL
